# Patient Record
Sex: FEMALE | Race: WHITE | NOT HISPANIC OR LATINO | URBAN - METROPOLITAN AREA
[De-identification: names, ages, dates, MRNs, and addresses within clinical notes are randomized per-mention and may not be internally consistent; named-entity substitution may affect disease eponyms.]

---

## 2023-07-20 ENCOUNTER — OFFICE VISIT (OUTPATIENT)
Dept: OBGYN CLINIC | Facility: MEDICAL CENTER | Age: 48
End: 2023-07-20
Payer: COMMERCIAL

## 2023-07-20 ENCOUNTER — APPOINTMENT (OUTPATIENT)
Dept: RADIOLOGY | Facility: MEDICAL CENTER | Age: 48
End: 2023-07-20
Payer: COMMERCIAL

## 2023-07-20 VITALS — DIASTOLIC BLOOD PRESSURE: 57 MMHG | SYSTOLIC BLOOD PRESSURE: 85 MMHG | HEART RATE: 50 BPM | WEIGHT: 193.4 LBS

## 2023-07-20 DIAGNOSIS — M25.561 RIGHT KNEE PAIN, UNSPECIFIED CHRONICITY: ICD-10-CM

## 2023-07-20 DIAGNOSIS — Z96.651 HISTORY OF TOTAL KNEE ARTHROPLASTY, RIGHT: ICD-10-CM

## 2023-07-20 DIAGNOSIS — Z01.89 ENCOUNTER FOR LOWER EXTREMITY COMPARISON IMAGING STUDY: ICD-10-CM

## 2023-07-20 DIAGNOSIS — Z96.651 PAIN DUE TO TOTAL RIGHT KNEE REPLACEMENT, INITIAL ENCOUNTER (HCC): Primary | ICD-10-CM

## 2023-07-20 DIAGNOSIS — T84.84XA PAIN DUE TO TOTAL RIGHT KNEE REPLACEMENT, INITIAL ENCOUNTER (HCC): Primary | ICD-10-CM

## 2023-07-20 PROCEDURE — 73560 X-RAY EXAM OF KNEE 1 OR 2: CPT

## 2023-07-20 PROCEDURE — 99204 OFFICE O/P NEW MOD 45 MIN: CPT | Performed by: STUDENT IN AN ORGANIZED HEALTH CARE EDUCATION/TRAINING PROGRAM

## 2023-07-20 PROCEDURE — 77073 BONE LENGTH STUDIES: CPT

## 2023-07-20 PROCEDURE — 73564 X-RAY EXAM KNEE 4 OR MORE: CPT

## 2023-07-20 RX ORDER — ALPRAZOLAM 0.5 MG/1
0.5 TABLET ORAL
COMMUNITY
Start: 2023-03-22

## 2023-07-20 RX ORDER — SUMATRIPTAN 100 MG/1
TABLET, FILM COATED ORAL
COMMUNITY
Start: 2023-07-03

## 2023-07-20 RX ORDER — VALACYCLOVIR HYDROCHLORIDE 1 G/1
2000 TABLET, FILM COATED ORAL
COMMUNITY
Start: 2023-03-16

## 2023-07-20 RX ORDER — MONTELUKAST SODIUM 10 MG/1
TABLET ORAL
COMMUNITY
Start: 2023-07-15

## 2023-07-20 RX ORDER — NABUMETONE 750 MG/1
TABLET, FILM COATED ORAL
COMMUNITY
Start: 2023-05-15

## 2023-07-20 RX ORDER — ALBUTEROL SULFATE 90 UG/1
2 AEROSOL, METERED RESPIRATORY (INHALATION) EVERY 4 HOURS PRN
COMMUNITY
Start: 2023-03-11

## 2023-07-20 RX ORDER — TIZANIDINE 4 MG/1
1 TABLET ORAL EVERY EVENING
COMMUNITY
Start: 2023-06-15

## 2023-07-20 RX ORDER — LEVOTHYROXINE SODIUM 0.03 MG/1
TABLET ORAL
COMMUNITY
Start: 2023-06-22

## 2023-07-20 NOTE — PROGRESS NOTES
Knee New Office Note    Assessment:     1. Right knee pain, unspecified chronicity    2. History of total knee arthroplasty, right    3. Encounter for lower extremity comparison imaging study        Plan:     Problem List Items Addressed This Visit    None  Visit Diagnoses     Right knee pain, unspecified chronicity    -  Primary    Relevant Orders    XR knee 4+ vw right injury    XR bone length (scanogram)    History of total knee arthroplasty, right        Relevant Orders    Sedimentation rate, automated    Ambulatory Referral to Dermatology    C-reactive protein    Encounter for lower extremity comparison imaging study        Relevant Orders    XR knee 1 or 2 vw left    XR bone length (scanogram)          Findings today are consistent with right knee pain with history of revision total knee arthroplasty. Imaging and prognosis was reviewed with the patient today. Discussed that she appears to be over-stuffed in the patellofemoral joint. She had better motion after her index arthroplasty without patellar resurfacing which corresponds to her increased patellar thickness with under-resection of her native bone with addition of patellar component. Explained that patellar resurfacing for anterior knee pain has mixed outcomes and approximately 50% of people to not see improvements and she falls in this category. She does have some laxity with pain on exam. Work up to rule out infection ordered including ESR and CRP. Referral to dermatology provided for allergy testing to the cement and metal components as she feels she may have some degree of an allergy. Follow up after testing is complete. Subjective:     Patient ID: Yovany Flores is a 49 y.o. female. Chief Complaint:  HPI:  49 y.o. female presents to the office for evaluation of right knee pain.  She has an extensive history of 9 knee arthroscopies with a total knee arthroplasty in January 2021 at [facility] and subsequent revision to resurface the patella at [facility] in December 2021. She did get some relief following the procedure, but has had return of medial knee pain. She tried an ablation to the knee with worsening symptoms. She notes that she has difficulty with stairs and prolonged sitting. She does note loss of flexion to the knee following the patellar resurfacing. Allergy:  Allergies   Allergen Reactions   • Magnesium Salicylate Shortness Of Breath   • Magnesium Sulfate Anaphylaxis   • Sulfa Antibiotics Anaphylaxis   • Terbutaline Anaphylaxis and Shortness Of Breath   • Cephalexin Hives and Rash   • Colchicine Nausea Only and Other (See Comments)     Feeling "off"     Medications:  all current active meds have been reviewed  Past Medical History:  No past medical history on file. Past Surgical History:  No past surgical history on file. Family History:  No family history on file. Social History:  Social History     Substance and Sexual Activity   Alcohol Use Not on file     Social History     Substance and Sexual Activity   Drug Use Not on file     Social History     Tobacco Use   Smoking Status Not on file   Smokeless Tobacco Not on file           ROS:  General: Per HPI  Skin: Negative, except if noted below  HEENT: Negative  Respiratory: Negative  Cardiovascular: Negative  Gastrointestinal: Negative  Urinary: Negative  Vascular: Negative  Musculoskeletal: Positive per HPI   Neurologic: Positive per HPI  Endocrine: Negative    Objective:  BP Readings from Last 1 Encounters:   07/20/23 (!) 85/57      Wt Readings from Last 1 Encounters:   07/20/23 87.7 kg (193 lb 6.4 oz)        Respiratory:   non-labored respirations    Lymphatics:  no palpable lymph nodes    Gait:   antalgic    Neurologic:   Alert and oriented times 3  Patient with normal sensation except as noted below  Deep tendon reflexes 2+ except as noted in MSK exam    Bilateral Lower Extremity:    Right knee:      Inspection: Well healed total knee incision and portal incisions    Overall limb alignment neutral    Effusion: trace    ROM 0-95 with pain and end range flexion    Extensor Lag: none    Palpation: medial patellar border tenderness to palpation    AP Stability at 90 deg mild laxity     M/L stability in full extension mild laxity and pain    M/L stability in mid-flexion mild laxity and pain    Motor: 5/5 Q/HS/TA/GS/P    Pulses: 2+ DP / 2+ PT    SILT DP/SP/S/S/TN    Imaging:  My interpretation XR AP scanogram/AP bilateral knee/lateral/campo/sunrise right knee: cemented PS total knee arthroplasty, components in good position. No fracture or dislocation. Does appear to have excessive thickness of patella with over-stuffed PFJ. BMI:   There is no height or weight on file to calculate BMI. BSA:   There is no height or weight on file to calculate BSA.            Scribe Attestation    I,:  Crystal Dalal am acting as a scribe while in the presence of the attending physician.:       I,:  Georgina Skinner DO personally performed the services described in this documentation    as scribed in my presence.:

## 2023-07-27 ENCOUNTER — TELEPHONE (OUTPATIENT)
Dept: OBGYN CLINIC | Facility: HOSPITAL | Age: 48
End: 2023-07-27

## 2023-07-27 NOTE — TELEPHONE ENCOUNTER
Caller: patient    Doctor: Lord Mejia    Reason for call: patient has tried getting ahold of Dr. Gerald Jimenez office to schedule from referral and has not been able to get through.  She is wondering if there are any other suggestions    Call back#: [phone]

## 2023-07-27 NOTE — TELEPHONE ENCOUNTER
Unfortunately he is one of the only physicians in the area that does that specific testing, so I would continue to try and get an appointment there.

## 2023-07-31 NOTE — TELEPHONE ENCOUNTER
There is another provider, though they are in [facility] and [place], that also does the testing. His name is Jono Finley, Allergy and Immunology. Phone number is [phone]. If someone could please call this patient and provide her with this information, that would be great.   Thank you

## 2023-07-31 NOTE — TELEPHONE ENCOUNTER
Caller: Patient     Doctor: Luiz Ellis     Reason for call: Patient states she has left numerous messages for this Doctor and nobody calls her back. She is wanting to see if maybe the office can try reaching out to them if possible.      Call back#: [phone]

## 2023-08-15 LAB
CRP SERPL-MCNC: 2.2 MG/L
ERYTHROCYTE [SEDIMENTATION RATE] IN BLOOD BY WESTERGREN METHOD: 2 MM/H

## 2024-02-16 ENCOUNTER — TELEPHONE (OUTPATIENT)
Age: 49
End: 2024-02-16

## 2024-02-16 NOTE — TELEPHONE ENCOUNTER
Caller: Patient    Doctor: Timothy    Reason for call: Patient is calling regarding message from earlier please call patient. Ty.    Call back#: [phone]

## 2024-02-16 NOTE — TELEPHONE ENCOUNTER
Caller: Patient     Doctor: Timothy     Reason for call: Patient stating her knee went a different direction then her body and now it's swollen on her Right knee which she had a knee replacement on she is looking to come in sooner than her scheduled follow up on 2/26 if possible because she is in a lot of pain   Please advise     Call back#: [phone]

## 2024-02-16 NOTE — TELEPHONE ENCOUNTER
Unfortunately this is the earliest appointment that we have available.  We will see her at this appointment. She may also see one of the sports med doctors to get xrays first if she would like.

## 2024-02-26 ENCOUNTER — OFFICE VISIT (OUTPATIENT)
Dept: OBGYN CLINIC | Facility: MEDICAL CENTER | Age: 49
End: 2024-02-26
Payer: COMMERCIAL

## 2024-02-26 VITALS — WEIGHT: 193 LBS | DIASTOLIC BLOOD PRESSURE: 80 MMHG | SYSTOLIC BLOOD PRESSURE: 116 MMHG

## 2024-02-26 DIAGNOSIS — Z96.651 PAIN DUE TO TOTAL RIGHT KNEE REPLACEMENT, INITIAL ENCOUNTER: ICD-10-CM

## 2024-02-26 DIAGNOSIS — T84.84XA PAIN DUE TO TOTAL RIGHT KNEE REPLACEMENT, INITIAL ENCOUNTER: ICD-10-CM

## 2024-02-26 DIAGNOSIS — S83.421A SPRAIN OF LATERAL COLLATERAL LIGAMENT OF RIGHT KNEE, INITIAL ENCOUNTER: Primary | ICD-10-CM

## 2024-02-26 PROCEDURE — 99214 OFFICE O/P EST MOD 30 MIN: CPT | Performed by: STUDENT IN AN ORGANIZED HEALTH CARE EDUCATION/TRAINING PROGRAM

## 2024-02-26 NOTE — PROGRESS NOTES
Knee New Office Note    Assessment:     1. Sprain of lateral collateral ligament of right knee, initial encounter    2. Pain due to total right knee replacement, initial encounter         Plan:     Problem List Items Addressed This Visit          Musculoskeletal and Integument    Sprain of lateral collateral ligament of right knee, initial encounter - Primary    Relevant Orders    Durable Medical Equipment     Other Visit Diagnoses       Pain due to total right knee replacement, initial encounter                Findings today are consistent with right knee LCL sprain with history of revision total knee arthroplasty (revision was patellar resurfacing). Imaging and prognosis was reviewed with the patient today. Hinged knee brace fitted and provided today in the office. Reviewed that this will take time to resolve. She was encouraged to work on her range of motion. She continues to have pain in the knee with worsening of flexion. Discussed revision of her patella as she has increased patellar thickness with under-resection of her native bone with addition of patellar component. Follow up in 4 weeks to re-assess.    Subjective:     Patient ID: Dahlia Encinas is a 48 y.o. female.  Chief Complaint:  HPI:  48 y.o. female presents to the office for follow up of right knee pain with history of revision total knee arthroplasty. Since last visit she had an injury due to slipping on slush to the right knee causing increased swelling. She has an extensive history of 9 knee arthroscopies with a total knee arthroplasty in January 2021 at [facility] and subsequent revision to resurface the patella at [facility] in December 2021. She has been having difficulty with her motion and with stairs. Her quality of life has diminished due to her symptoms.    Allergy:  Allergies   Allergen Reactions    Magnesium Salicylate Shortness Of Breath    Magnesium Sulfate Anaphylaxis    Sulfa Antibiotics Anaphylaxis    Terbutaline Anaphylaxis and  "Shortness Of Breath    Cephalexin Hives and Rash    Colchicine Nausea Only and Other (See Comments)     Feeling \"off\"     Medications:  all current active meds have been reviewed  Past Medical History:  History reviewed. No pertinent past medical history.  Past Surgical History:  History reviewed. No pertinent surgical history.  Family History:  History reviewed. No pertinent family history.  Social History:  Social History     Substance and Sexual Activity   Alcohol Use None     Social History     Substance and Sexual Activity   Drug Use Not on file     Social History     Tobacco Use   Smoking Status Never   Smokeless Tobacco Never           ROS:  General: Per HPI  Skin: Negative, except if noted below  HEENT: Negative  Respiratory: Negative  Cardiovascular: Negative  Gastrointestinal: Negative  Urinary: Negative  Vascular: Negative  Musculoskeletal: Positive per HPI   Neurologic: Positive per HPI  Endocrine: Negative    Objective:  BP Readings from Last 1 Encounters:   02/26/24 116/80      Wt Readings from Last 1 Encounters:   02/26/24 87.5 kg (193 lb)        Respiratory:   non-labored respirations    Lymphatics:  no palpable lymph nodes    Gait:   antalgic     Neurologic:   Alert and oriented times 3  Patient with normal sensation except as noted below  Deep tendon reflexes 2+ except as noted in MSK exam     Bilateral Lower Extremity:     Right knee:     Inspection: Well healed total knee incision and portal incisions    Overall limb alignment neutral    Effusion: trace    ROM 5-85 with pain at end range flexion    Extensor Lag: none    Palpation: medial patellar border and LCL tenderness to palpation    AP Stability at 90 deg mild laxity     M/L stability in full extension laxity and pain    M/L stability in mid-flexion laxity and pain    Motor: 5/5 Q/HS/TA/GS/P    Pulses: 2+ DP / 2+ PT    SILT DP/SP/S/S/TN    Imaging:  No new imaging obtained today    BMI:   There is no height or weight on file to calculate " BMI.  BSA:   There is no height or weight on file to calculate BSA.           Scribe Attestation      I,:  Rosario Seaman am acting as a scribe while in the presence of the attending physician.:       I,:  Jarocho Aguirre, DO personally performed the services described in this documentation    as scribed in my presence.:

## 2024-03-08 ENCOUNTER — PATIENT MESSAGE (OUTPATIENT)
Dept: OBGYN CLINIC | Facility: MEDICAL CENTER | Age: 49
End: 2024-03-08

## 2024-03-19 NOTE — PATIENT COMMUNICATION
Caller: Skylar [facility]    Doctor: Timothy    Reason for call: Skylar called to verify that patient was advised to contact her PCP regarding the completion of Welfare paperwork.     Call back#: n/a  
[phone]

## 2024-03-20 ENCOUNTER — TELEPHONE (OUTPATIENT)
Age: 49
End: 2024-03-20

## 2024-03-20 NOTE — LETTER
March 22, 2024     Patient: Dahlia Enicnas  YOB: 1975  Date of Visit: 2/26/24      To Whom it May Concern:    Dahlia Encinas is a patient known to our practice.  Dahlia was seen in my office on 2/26/24.  She has a history of a previous knee replacement that was not performed by us.  We have never operated on this patient.  At this time we do not feel that there is a reason to keep her out of work.  We also do not fill out welfare paperwork or forms.       If you have any questions or concerns, please don't hesitate to call.         Sincerely,        Dr. Jarocho Aguirre D.O.        CC: No Recipients

## 2024-03-20 NOTE — TELEPHONE ENCOUNTER
Caller: Janel-Triage Nurse at [facility]    Doctor: Timothy     Reason for call: Would like to speak with some in triage regarding welfare paperwork, they are asking why these disability forms are being sent to them to complete? Also the patient is requesting to be out of work for a year so if they need to complete the forms they will need any documentation that we have for to be out of work due to her knee injury.    Call back#: [phone]    Fax # [phone]

## 2024-03-22 NOTE — TELEPHONE ENCOUNTER
I placed a note in the patient's chart stating that we did not operate on her, that we can't keep her out of work and that we do not do welfare paperwork.

## 2024-03-26 ENCOUNTER — OFFICE VISIT (OUTPATIENT)
Dept: OBGYN CLINIC | Facility: MEDICAL CENTER | Age: 49
End: 2024-03-26
Payer: COMMERCIAL

## 2024-03-26 VITALS — DIASTOLIC BLOOD PRESSURE: 67 MMHG | SYSTOLIC BLOOD PRESSURE: 96 MMHG | WEIGHT: 193 LBS | HEART RATE: 59 BPM

## 2024-03-26 DIAGNOSIS — T84.84XD PAIN DUE TO TOTAL RIGHT KNEE REPLACEMENT, SUBSEQUENT ENCOUNTER: ICD-10-CM

## 2024-03-26 DIAGNOSIS — Z96.651 PAIN DUE TO TOTAL RIGHT KNEE REPLACEMENT, SUBSEQUENT ENCOUNTER: ICD-10-CM

## 2024-03-26 DIAGNOSIS — S83.421D SPRAIN OF LATERAL COLLATERAL LIGAMENT OF RIGHT KNEE, SUBSEQUENT ENCOUNTER: Primary | ICD-10-CM

## 2024-03-26 PROCEDURE — 99213 OFFICE O/P EST LOW 20 MIN: CPT | Performed by: STUDENT IN AN ORGANIZED HEALTH CARE EDUCATION/TRAINING PROGRAM

## 2024-03-26 RX ORDER — AMOXICILLIN AND CLAVULANATE POTASSIUM 875; 125 MG/1; MG/1
1 TABLET, FILM COATED ORAL 2 TIMES DAILY
COMMUNITY
Start: 2024-03-15

## 2024-03-26 NOTE — PROGRESS NOTES
"Knee Follow up Office Note    Assessment:     1. Pain due to total right knee replacement, subsequent encounter    2. Sprain of lateral collateral ligament of right knee, subsequent encounter          Plan:     Problem List Items Addressed This Visit          Musculoskeletal and Integument    Sprain of lateral collateral ligament of right knee, initial encounter     Other Visit Diagnoses       Pain due to total right knee replacement, subsequent encounter    -  Primary             49 y/o female with right knee LCL sprain with history of revision total knee arthroplasty (revision was patellar resurfacing). On physical exam today she has improvement with stability and less pain with stress to the LCL.  She still has pain to palpation over the LCL, but overall improved.  We will have her continue with her hinged knee brace for another 2 weeks, and then wean out of the brace over the following 2 weeks.  We will see her back in 6 weeks to see how she is doing once she is out of the brace.  She was encouraged to work on her range of motion.     Subjective:     Patient ID: Dahlia Encinas is a 48 y.o. female.  Chief Complaint:  HPI:  48 y.o. female presents to the office for follow up of right knee pain with history of revision total knee arthroplasty.  She was diagnosed with an LCL sprain at last visit after she sustained a slip on slush to the right knee causing increased swelling. She has been using a hinged knee brace which she feels is helping her symptoms.  She has less pain and feelings of instability of the right knee.     Allergy:  Allergies   Allergen Reactions    Magnesium Salicylate Shortness Of Breath    Magnesium Sulfate Anaphylaxis    Sulfa Antibiotics Anaphylaxis    Terbutaline Anaphylaxis and Shortness Of Breath    Cephalexin Hives and Rash    Colchicine Nausea Only and Other (See Comments)     Feeling \"off\"     Medications:  all current active meds have been reviewed  Past Medical History:  History " reviewed. No pertinent past medical history.  Past Surgical History:  History reviewed. No pertinent surgical history.  Family History:  History reviewed. No pertinent family history.  Social History:  Social History     Substance and Sexual Activity   Alcohol Use None     Social History     Substance and Sexual Activity   Drug Use Not on file     Social History     Tobacco Use   Smoking Status Never   Smokeless Tobacco Never           ROS:  General: Per HPI  Skin: Negative, except if noted below  HEENT: Negative  Respiratory: Negative  Cardiovascular: Negative  Gastrointestinal: Negative  Urinary: Negative  Vascular: Negative  Musculoskeletal: Positive per HPI   Neurologic: Positive per HPI  Endocrine: Negative    Objective:  BP Readings from Last 1 Encounters:   03/26/24 96/67      Wt Readings from Last 1 Encounters:   03/26/24 87.5 kg (193 lb)        Respiratory:   non-labored respirations    Lymphatics:  no palpable lymph nodes    Gait:   antalgic     Neurologic:   Alert and oriented times 3  Patient with normal sensation except as noted below  Deep tendon reflexes 2+ except as noted in MSK exam     Bilateral Lower Extremity:     Right knee:     Inspection: Well healed total knee incision and portal incisions    Overall limb alignment neutral    Effusion: trace    ROM 5-85 with pain at end range flexion    Extensor Lag: none    Palpation: medial patellar border and LCL tenderness to palpation    AP Stability at 90 deg mild laxity     M/L stability in full extension laxity and pain    M/L stability in mid-flexion laxity and pain    Motor: 5/5 Q/HS/TA/GS/P    Pulses: 2+ DP / 2+ PT    SILT DP/SP/S/S/TN        BMI:   There is no height or weight on file to calculate BMI.  BSA:   There is no height or weight on file to calculate BSA.           Scribe Attestation      I,:  Cata Cintron PA-C am acting as a scribe while in the presence of the attending physician.:       I,:  Jarocho Aguirre,  personally  performed the services described in this documentation    as scribed in my presence.:

## 2024-07-05 ENCOUNTER — OFFICE VISIT (OUTPATIENT)
Dept: OBGYN CLINIC | Facility: CLINIC | Age: 49
End: 2024-07-05
Payer: COMMERCIAL

## 2024-07-05 ENCOUNTER — APPOINTMENT (OUTPATIENT)
Dept: RADIOLOGY | Facility: CLINIC | Age: 49
End: 2024-07-05
Payer: COMMERCIAL

## 2024-07-05 VITALS
DIASTOLIC BLOOD PRESSURE: 89 MMHG | HEIGHT: 64 IN | SYSTOLIC BLOOD PRESSURE: 141 MMHG | WEIGHT: 175 LBS | BODY MASS INDEX: 29.88 KG/M2

## 2024-07-05 DIAGNOSIS — M24.661 ARTHROFIBROSIS OF KNEE JOINT, RIGHT: Primary | ICD-10-CM

## 2024-07-05 DIAGNOSIS — Z96.651 PAIN DUE TO TOTAL RIGHT KNEE REPLACEMENT, SUBSEQUENT ENCOUNTER: ICD-10-CM

## 2024-07-05 DIAGNOSIS — T84.84XD PAIN DUE TO TOTAL RIGHT KNEE REPLACEMENT, SUBSEQUENT ENCOUNTER: ICD-10-CM

## 2024-07-05 DIAGNOSIS — M25.561 RIGHT KNEE PAIN, UNSPECIFIED CHRONICITY: ICD-10-CM

## 2024-07-05 PROCEDURE — 73562 X-RAY EXAM OF KNEE 3: CPT

## 2024-07-05 PROCEDURE — 99215 OFFICE O/P EST HI 40 MIN: CPT | Performed by: STUDENT IN AN ORGANIZED HEALTH CARE EDUCATION/TRAINING PROGRAM

## 2024-07-05 RX ORDER — ACETAMINOPHEN 325 MG/1
975 TABLET ORAL ONCE
OUTPATIENT
Start: 2024-07-05 | End: 2024-07-05

## 2024-07-05 RX ORDER — ASCORBIC ACID 500 MG
500 TABLET ORAL 2 TIMES DAILY
Qty: 60 TABLET | Refills: 1 | Status: SHIPPED | OUTPATIENT
Start: 2024-07-05

## 2024-07-05 RX ORDER — SODIUM CHLORIDE, SODIUM LACTATE, POTASSIUM CHLORIDE, CALCIUM CHLORIDE 600; 310; 30; 20 MG/100ML; MG/100ML; MG/100ML; MG/100ML
125 INJECTION, SOLUTION INTRAVENOUS CONTINUOUS
OUTPATIENT
Start: 2024-07-05

## 2024-07-05 RX ORDER — CHLORHEXIDINE GLUCONATE 40 MG/ML
SOLUTION TOPICAL DAILY PRN
OUTPATIENT
Start: 2024-07-05

## 2024-07-05 RX ORDER — FOLIC ACID 1 MG/1
1 TABLET ORAL DAILY
Qty: 30 TABLET | Refills: 1 | Status: SHIPPED | OUTPATIENT
Start: 2024-07-05

## 2024-07-05 RX ORDER — CHLORHEXIDINE GLUCONATE ORAL RINSE 1.2 MG/ML
15 SOLUTION DENTAL ONCE
OUTPATIENT
Start: 2024-07-05 | End: 2024-07-05

## 2024-07-05 NOTE — PROGRESS NOTES
Knee Follow up Office Note    Assessment:     1. Pain due to total right knee replacement, subsequent encounter    2. 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 On physical exam today she has improvement with stability and now no pain with stress to the LCL. Discussed continued observation versus surgical intervention. Reviewed that surgical intervention may not provide her complete relief or increased motion. We did discuss that her motion was better prior to her patellar resurfacing at [facility]. We discussed again that her patellofemoral joint is overstuffed. This overstuffing can lead to decreased flexion as a result. She cannot tolerate her knee due to the stiffness and pain. We discussed revision of her patellar component to normalize her patellar thickness with scare excision along with poly exchange. We reiterated that there is a chance that this does not improve her symptoms and could potentially worsen her symptoms. Due to her dysfunction she would like to proceed with revision.     The patient has elected to proceed with revision right TKA. Risks and benefits of surgery to include but not limited to bleeding, infection, damage to surrounding structures, hardware failure, instability, fracture, dislocation, need for further surgery, continued pain, stiffness, blood clots, stroke, and heart attack was discussed with the patient. Informed consent was signed today in the office. The patient has met with our surgical schedulers and our preoperative joint replacement pathway has been  "initiated. All questions were answered. Patient will follow-up 2 weeks post operatively. BMI is appropriate at 30.04 and is a nonsmoker.      Subjective:     Patient ID: Dahlia Encinas is a 48 y.o. female.  Chief Complaint:  HPI:  48 y.o. female presents to the office for follow up of right knee pain with history of revision total knee arthroplasty and LCL sprain. She has had improvement in her LCL symptoms since last visit and uses her short hinged knee brace for comfort. She continues to experience lack of motion. She notes that her quality of life has diminished and she would like to consider her treatment options. She has significant pain with attempted flexion.     Allergy:  Allergies   Allergen Reactions    Magnesium Salicylate Shortness Of Breath    Magnesium Sulfate Anaphylaxis    Sulfa Antibiotics Anaphylaxis    Terbutaline Anaphylaxis and Shortness Of Breath    Cephalexin Hives and Rash    Colchicine Nausea Only and Other (See Comments)     Feeling \"off\"     Medications:  all current active meds have been reviewed  Past Medical History:  History reviewed. No pertinent past medical history.  Past Surgical History:  History reviewed. No pertinent surgical history.  Family History:  History reviewed. No pertinent family history.  Social History:  Social History     Substance and Sexual Activity   Alcohol Use None     Social History     Substance and Sexual Activity   Drug Use Not on file     Social History     Tobacco Use   Smoking Status Never   Smokeless Tobacco Never           ROS:  General: Per HPI  Skin: Negative, except if noted below  HEENT: Negative  Respiratory: Negative  Cardiovascular: Negative  Gastrointestinal: Negative  Urinary: Negative  Vascular: Negative  Musculoskeletal: Positive per HPI   Neurologic: Positive per HPI  Endocrine: Negative    Objective:  BP Readings from Last 1 Encounters:   07/05/24 141/89      Wt Readings from Last 1 Encounters:   07/05/24 79.4 kg (175 lb)    " "    Respiratory:   non-labored respirations    Lymphatics:  no palpable lymph nodes    Gait:   antalgic     Neurologic:   Alert and oriented times 3  Patient with normal sensation except as noted below  Deep tendon reflexes 2+ except as noted in MSK exam     Bilateral Lower Extremity:     Right knee:     Inspection: Well healed total knee incision and portal incisions    Overall limb alignment neutral    Effusion: trace    ROM 5-85 with pain at end range flexion    Extensor Lag: none    Palpation: mild diffuse    AP Stability at 90 deg mild laxity     M/L stability in full extension laxity and pain    M/L stability in mid-flexion laxity and pain    Motor: 5/5 Q/HS/TA/GS/P    Pulses: 2+ DP / 2+ PT    SILT DP/SP/S/S/TN        BMI:   Estimated body mass index is 30.04 kg/m² as calculated from the following:    Height as of this encounter: 5' 4\" (1.626 m).    Weight as of this encounter: 79.4 kg (175 lb).  BSA:   Estimated body surface area is 1.85 meters squared as calculated from the following:    Height as of this encounter: 5' 4\" (1.626 m).    Weight as of this encounter: 79.4 kg (175 lb).           Scribe Attestation      I,:  Rosario Seaman am acting as a scribe while in the presence of the attending physician.:       I,:  Jarocho Aguirre, DO personally performed the services described in this documentation    as scribed in my presence.:              "

## 2024-07-05 NOTE — PROGRESS NOTES
"Knee Follow up Office Note    Assessment:     1. Right knee pain, unspecified chronicity    2. Sprain of lateral collateral ligament of right knee, initial encounter          Plan:     Problem List Items Addressed This Visit       Sprain of lateral collateral ligament of right knee, initial encounter    Relevant Orders    XR knee 3 vw right non injury     Other Visit Diagnoses       Right knee pain, unspecified chronicity    -  Primary    Relevant Orders    XR knee 3 vw right non injury             47 y/o female with right knee LCL sprain with history of revision total knee arthroplasty (revision was patellar resurfacing). On physical exam today she has improvement with stability and less pain with stress to the LCL.  She still has pain to palpation over the LCL, but overall improved.  We will have her continue with her hinged knee brace for another 2 weeks, and then wean out of the brace over the following 2 weeks.  We will see her back in 6 weeks to see how she is doing once she is out of the brace.  She was encouraged to work on her range of motion.     Subjective:     Patient ID: Dahlia Encinas is a 48 y.o. female.  Chief Complaint:  HPI:  48 y.o. female presents to the office for follow up of right knee pain with history of revision total knee arthroplasty.  She was diagnosed with an LCL sprain at last visit after she sustained a slip on slush to the right knee causing increased swelling. She has been using a hinged knee brace which she feels is helping her symptoms.  She has less pain and feelings of instability of the right knee.     Allergy:  Allergies   Allergen Reactions    Magnesium Salicylate Shortness Of Breath    Magnesium Sulfate Anaphylaxis    Sulfa Antibiotics Anaphylaxis    Terbutaline Anaphylaxis and Shortness Of Breath    Cephalexin Hives and Rash    Colchicine Nausea Only and Other (See Comments)     Feeling \"off\"     Medications:  all current active meds have been reviewed  Past Medical " "History:  History reviewed. No pertinent past medical history.  Past Surgical History:  History reviewed. No pertinent surgical history.  Family History:  History reviewed. No pertinent family history.  Social History:  Social History     Substance and Sexual Activity   Alcohol Use None     Social History     Substance and Sexual Activity   Drug Use Not on file     Social History     Tobacco Use   Smoking Status Never   Smokeless Tobacco Never           ROS:  General: Per HPI  Skin: Negative, except if noted below  HEENT: Negative  Respiratory: Negative  Cardiovascular: Negative  Gastrointestinal: Negative  Urinary: Negative  Vascular: Negative  Musculoskeletal: Positive per HPI   Neurologic: Positive per HPI  Endocrine: Negative    Objective:  BP Readings from Last 1 Encounters:   07/05/24 141/89      Wt Readings from Last 1 Encounters:   07/05/24 79.4 kg (175 lb)        Respiratory:   non-labored respirations    Lymphatics:  no palpable lymph nodes    Gait:   antalgic     Neurologic:   Alert and oriented times 3  Patient with normal sensation except as noted below  Deep tendon reflexes 2+ except as noted in MSK exam     Bilateral Lower Extremity:     Right knee:     Inspection: Well healed total knee incision and portal incisions    Overall limb alignment neutral    Effusion: trace    ROM 5-85 with pain at end range flexion    Extensor Lag: none    Palpation: medial patellar border and LCL tenderness to palpation    AP Stability at 90 deg mild laxity     M/L stability in full extension laxity and pain    M/L stability in mid-flexion laxity and pain    Motor: 5/5 Q/HS/TA/GS/P    Pulses: 2+ DP / 2+ PT    SILT DP/SP/S/S/TN        BMI:   Estimated body mass index is 30.04 kg/m² as calculated from the following:    Height as of this encounter: 5' 4\" (1.626 m).    Weight as of this encounter: 79.4 kg (175 lb).  BSA:   Estimated body surface area is 1.85 meters squared as calculated from the following:    Height as of " "this encounter: 5' 4\" (1.626 m).    Weight as of this encounter: 79.4 kg (175 lb).           Scribe Attestation      I,:   am acting as a scribe while in the presence of the attending physician.:       I,:   personally performed the services described in this documentation    as scribed in my presence.:              "

## 2024-07-11 ENCOUNTER — TELEPHONE (OUTPATIENT)
Age: 49
End: 2024-07-11

## 2024-07-11 DIAGNOSIS — M25.561 RIGHT KNEE PAIN, UNSPECIFIED CHRONICITY: Primary | ICD-10-CM

## 2024-07-11 RX ORDER — MULTIVITAMIN
1 TABLET ORAL DAILY
Qty: 30 TABLET | Refills: 0 | Status: SHIPPED | OUTPATIENT
Start: 2024-07-11

## 2024-07-11 NOTE — TELEPHONE ENCOUNTER
Caller: patient     Doctor: Timothy     Reason for call: please send multivitamin to   [id number] - [place] - [address]     Patient inquiring if there is a waiting list for surgery dates.    Call back#: [phone]

## 2024-07-18 NOTE — PRE-PROCEDURE INSTRUCTIONS
Pre-Surgery Instructions:   Medication Instructions    albuterol (PROVENTIL HFA,VENTOLIN HFA) 90 mcg/act inhaler Uses PRN- OK to take day of surgery    ALPRAZolam (XANAX) 0.5 mg tablet Uses PRN- OK to take day of surgery    ascorbic acid (VITAMIN C) 500 MG tablet Hold day of surgery.    Cholecalciferol (VITAMIN D3) 1,000 units tablet Hold day of surgery.    folic acid (FOLVITE) 1 mg tablet Hold day of surgery.    levothyroxine 25 mcg tablet Take night before surgery    montelukast (SINGULAIR) 10 mg tablet Take night before surgery    Multiple Vitamin (multivitamin) tablet Hold day of surgery.    SUMAtriptan (IMITREX) 100 mg tablet Uses PRN- DO NOT take day of surgery    tiZANidine (ZANAFLEX) 4 mg tablet Take night before surgery    valACYclovir (VALTREX) 1,000 mg tablet Uses PRN- OK to take day of surgery    Medication instructions for day surgery reviewed. Please use only a sip of water to take your instructed medications. Avoid all over the counter vitamins, supplements and NSAIDS for one week prior to surgery per anesthesia guidelines. Tylenol is ok to take as needed.     You will receive a call one business day prior to surgery with an arrival time and hospital directions. If your surgery is scheduled on a Monday, the hospital will be calling you on the Friday prior to your surgery. If you have not heard from anyone by 8pm, please call the hospital supervisor through the hospital  at [phone]. ([place] [phone] or [place] [phone]).    Do not eat or drink anything after midnight the night before your surgery, including candy, mints, lifesavers, or chewing gum. Do not drink alcohol 24hrs before your surgery. Try not to smoke at least 24hrs before your surgery.       Follow the pre surgery showering instructions as listed in the “My Surgical Experience Booklet” or otherwise provided by your surgeon's office. Do not use a blade to shave the surgical area 1 week before surgery. It is okay to  use a clean electric clippers up to 24 hours before surgery. Do not apply any lotions, creams, including makeup, cologne, deodorant, or perfumes after showering on the day of your surgery. Do not use dry shampoo, hair spray, hair gel, or any type of hair products.     No contact lenses, eye make-up, or artificial eyelashes. Remove nail polish, including gel polish, and any artificial, gel, or acrylic nails if possible. Remove all jewelry including rings and body piercing jewelry.     Wear causal clothing that is easy to take on and off. Consider your type of surgery.    Keep any valuables, jewelry, piercings at home. Please bring any specially ordered equipment (sling, braces) if indicated.    Arrange for a responsible person to drive you to and from the hospital on the day of your surgery. Please confirm the visitor policy for the day of your procedure when you receive your phone call with an arrival time.     Call the surgeon's office with any new illnesses, exposures, or additional questions prior to surgery.    Please reference your “My Surgical Experience Booklet” for additional information to prepare for your upcoming surgery.

## 2024-07-22 ENCOUNTER — EVALUATION (OUTPATIENT)
Age: 49
End: 2024-07-22
Payer: COMMERCIAL

## 2024-07-22 DIAGNOSIS — M25.561 CHRONIC PAIN OF RIGHT KNEE: Primary | ICD-10-CM

## 2024-07-22 DIAGNOSIS — G89.29 CHRONIC PAIN OF RIGHT KNEE: Primary | ICD-10-CM

## 2024-07-22 DIAGNOSIS — Z01.818 PRE-OP EVALUATION: ICD-10-CM

## 2024-07-22 DIAGNOSIS — Z96.651 PAIN DUE TO TOTAL RIGHT KNEE REPLACEMENT, SUBSEQUENT ENCOUNTER: ICD-10-CM

## 2024-07-22 DIAGNOSIS — T84.84XD PAIN DUE TO TOTAL RIGHT KNEE REPLACEMENT, SUBSEQUENT ENCOUNTER: ICD-10-CM

## 2024-07-22 PROCEDURE — 97110 THERAPEUTIC EXERCISES: CPT

## 2024-07-22 PROCEDURE — 97161 PT EVAL LOW COMPLEX 20 MIN: CPT

## 2024-07-22 NOTE — PROGRESS NOTES
PT Evaluation     Today's date: 2024  Patient name: Dahlia Encinas  : 1975  MRN: [id number]  Referring provider: Cata Cintron PA-C  Dx:   Encounter Diagnosis     ICD-10-CM    1. Chronic pain of right knee  M25.561     G89.29       2. Pre-op evaluation  Z01.818       3. Pain due to total right knee replacement, subsequent encounter  T84.84XD Ambulatory referral to Physical Therapy    Z96.651           Start Time: 1415  Stop Time: 1445  Total time in clinic (min): 30 minutes    Assessment  Impairments: abnormal coordination, abnormal gait, abnormal muscle firing, abnormal muscle tone, abnormal or restricted ROM, activity intolerance, impaired balance, impaired physical strength, lacks appropriate home exercise program, pain with function, weight-bearing intolerance, poor posture  and poor body mechanics  Functional limitations: prolonged standing/walking; ADLs - cooking, cleaning, dressing, bathing; stair negotiation  Symptom irritability: high    Assessment details: Dahlia Encinas is a 48 y.o. female presenting with R knee pain pre-operatively to R TKA revision scheduled on 2024.  Primary impairments include chronic R knee pain with functional activities, R hip and knee weakness, R knee flexion and extension AROM dysfunction, quadriceps motor control dysfunction.  Educated pt on anatomy and physiology of diagnosis.  Will benefit from skilled PT interventions for community reintegration, ADL management/independence, return to work/sport/hobbies.  Provided pt with written home exercise program to be completed daily.    Understanding of Dx/Px/POC: good     Prognosis: fair    Goals    Short Term Goals:  In 6 weeks, the patient will:  1. Improve R knee extension ROM to at least -10 degrees  2. Improve R knee flexion ROM to at least 100 degrees  3. Supervision with [facility] for self-care    Long Term Goals:  In 12 weeks, the patient will:  1. Improve R hip/knee strength to at least 4/5 MMT  2. FOTO to  "greater than predicted value  3. Independent with HEP for self-care      Plan  Patient would benefit from: skilled physical therapy  Planned modality interventions: manual electrical stimulation    Planned therapy interventions: abdominal trunk stabilization, activity modification, balance, balance/weight bearing training, behavior modification, body mechanics training, community reintegration, coordination, fine motor coordination training, flexibility, functional ROM exercises, gait training, graded activity, graded exercise, graded motor, home exercise program, work reintegration, therapeutic training, therapeutic exercise, therapeutic activities, stretching, strengthening, self care, postural training, patient education, neuromuscular re-education, motor coordination training, massage, manual therapy, joint mobilization and ADL training    Frequency: 2-3x week  Duration in weeks: 12  Plan of Care beginning date: 7/22/2024  Plan of Care expiration date: 10/14/2024  Treatment plan discussed with: patient        Subjective    Pain Location: R grace-patellar, mostly anterior R knee pain  Pain Intensity: achey; current 2/10, worse 8/10  CECI: 2nd R TKA revision  DOI: scheduled 8/14/2024 (initial TKA on 1/2021, 1st revision 12/2021)  Aggravating Factors: prolonged sitting then transitioning to standing/walking, prolonged walking/standing, stair negotiation  Alleviating Factors: none  Living Situation: stairs at home, difficulty with dressing/bathing/cooking/cleaning, occasionally requires assistance getting out of the shower  Constitutional S/S: denies paresthesias, intermittent swelling  Goals: \"normal life\"  PLOF: knee exercises, goes for walks      Objective    Range of Motion: Goniometric revealed the following findings (in degrees).  Joint Motion Right: 7/22/2024 Left: 7/22/2024   Knee Extension -19 0   Knee Flexion 80 140     Strength: MMT revealed the following findings.  Joint Motion Right: 7/22/2024 Left: " 2024   Hip Flexion 3+/5 4+/5   Hip Abduction 4-/5 4+/5   Hip Adduction 4-/5 4+/5   Hip Extension 4-/5 4+/5   Knee Extension 3+/5 5/5   Knee Flexion 3+/5 5/5   Ankle Plantarflexion 4/5 4+/5   Ankle Dorsiflexion 4/5 4+/5     Additional Assessments:  Palpation: maximal TTP grace-patellar - significant pain aggravation with patellar mobilization and STM distal quad  Observation: incision from previous TKA and 1st TKA revision  Gait Pattern: antalgic, decrease knee flex/ext with gait  Balance: impaired  TU.79 seconds w/ UE assistance on chair    Risk Assessment and Prediction Tool results reviewed. Patient reported functional outcome scores reviewed. Discussed DOS and patient's questions were answered to patient's satisfaction. Mobility/ROM results per above. Strength results per above. Balance/Gait (including Timed Up & Go) per above. Virtual Home Assessment was reviewed with patient. Home Preparation Checklist was reviewed with patient including identification of care partner and encouragement of single level set-up. Post-operative pain management expectations discussed to the patient's satisfaction. Post-operative gait training for level ground, stairs, and car transfers was performed. Patient demonstrated competence with immediate post-operative home exercise program.           Precautions: hx of many R knee surgeries - (most recent R TKA 2021 and R TKA revision 2021); 2nd R TKA revision 2024    POC expires Unit limit Auth Expiration date PT/OT + Visit Limit?   10/14/24 BOMN Pending BOMN     Visit/Unit Tracking  AUTH Status:  Date         Pending Used 1         Remaining             Pertinent Findings:                                                                                            Test / Measure  2024   FOTO (Predicted Post-Op) Post-Op   TUG 8.79s w/ UE push off   R hip flexion MMT 3+/5   R knee flex/ext MMT 3+/5   R knee ROM -19 ext  80 flex         Manuals             R  knee PROM, stretching                                                    Neuro Re-Ed             Quad sets             Heel slides             Mini squats                                                                 Ther Ex             HEP review / edu 10'            Rec bike             SAQ             LAQ             HR/TR             Supine gastroc S             STS             FSU             LSU                                                    Ther Activity                                                                                           Gait Training                                       Modalities

## 2024-07-23 ENCOUNTER — TELEPHONE (OUTPATIENT)
Dept: OBGYN CLINIC | Facility: HOSPITAL | Age: 49
End: 2024-07-23

## 2024-08-06 ENCOUNTER — TELEPHONE (OUTPATIENT)
Age: 49
End: 2024-08-06

## 2024-08-06 NOTE — TELEPHONE ENCOUNTER
Caller: [facility]    Doctor/Office: Timothy MCKEON#: [phone]      What needs to be faxed: Pre-op Medical Clearance form-Patient is at their office now    ATTN to: [facility]    Fax#: [phone]

## 2024-08-07 ENCOUNTER — TELEPHONE (OUTPATIENT)
Dept: OBGYN CLINIC | Facility: HOSPITAL | Age: 49
End: 2024-08-07

## 2024-08-09 ENCOUNTER — APPOINTMENT (OUTPATIENT)
Dept: PREADMISSION TESTING | Facility: HOSPITAL | Age: 49
DRG: 313 | End: 2024-08-09
Payer: COMMERCIAL

## 2024-08-09 ENCOUNTER — ANESTHESIA EVENT (OUTPATIENT)
Dept: PERIOP | Facility: HOSPITAL | Age: 49
DRG: 313 | End: 2024-08-09
Payer: COMMERCIAL

## 2024-08-13 PROBLEM — E07.9 DISEASE OF THYROID GLAND: Status: ACTIVE | Noted: 2024-08-13

## 2024-08-13 PROBLEM — Z96.651 PAIN DUE TO TOTAL RIGHT KNEE REPLACEMENT (HCC): Status: ACTIVE | Noted: 2024-08-13

## 2024-08-13 PROBLEM — T84.84XA PAIN DUE TO TOTAL RIGHT KNEE REPLACEMENT (HCC): Status: ACTIVE | Noted: 2024-08-13

## 2024-08-13 NOTE — ANESTHESIA PREPROCEDURE EVALUATION
Procedure:  ARTHROPLASTY KNEE TOTAL REVISION (Right: Knee)    Relevant Problems   Endocrine   (+) Disease of thyroid gland      Rheumatology   (+) Pain due to total right knee replacement (HCC)        Physical Exam    Airway    Mallampati score: II  TM Distance: >3 FB  Neck ROM: full     Dental   No notable dental hx     Cardiovascular      Pulmonary      Other Findings  post-pubertal.      Anesthesia Plan  ASA Score- 2     Anesthesia Type- general with ASA Monitors.         Additional Monitors:     Airway Plan: ETT.    Comment: Adductor block with exparel.       Plan Factors-    Chart reviewed.   Existing labs reviewed. Patient summary reviewed.    Patient is not a current smoker.              Induction- intravenous.    Postoperative Plan- Plan for postoperative opioid use.         Informed Consent- Anesthetic plan and risks discussed with patient.  I personally reviewed this patient with the CRNA. Discussed and agreed on the Anesthesia Plan with the CRNA..

## 2024-08-13 NOTE — DISCHARGE INSTR - AVS FIRST PAGE
Dr. Aguirre Knee Replacement    What to Expect/Activity  It is normal to have some discomfort in your knee for several days to weeks.  You are weight bearing as tolerated to your operative leg with assist devices.  Please use crutches/walker when ambulating until your follow-up  Swelling and discomfort in the knee is normal for several days after surgery. For the first 2-3 days, use ice around the knee to help. Use for 20-30 minutes every 1-2 hours for 48 hours, while awake. You may continue beyond 48 hours as needed.  Place one or two pillows underneath your calf, not your knee, to reduce swelling.  Physical therapy on your own at home should start as soon as possible (see below). Please perform heel slides and extension exercises on your own as well (see diagram).  Please use incentive spirometer 10 times per hour while awake (see diagram).    Dressing/Wound Care/Bathing  You may remove your toe-to-groin dressing 24 hours after surgery. There will be a surgical dressing over your incision that stays in place until follow-up unless water gets under the bandage and then it should be removed.   You may start showering 24 hours after surgery, the surgical dressing will remain in place. Please pat the dressing dry. If you notice the dressing appears saturated or is starting to come off, please replace with dry dressing.  You can keep the dressing in place until follow-up in the office.   Do not place any creams, ointments or gels on or around the incision.  No baths, swimming or submerging until cleared by Dr. Aguirre    Pain Management/Medications  You may resume your usual medications.  Please take the following medications:  Anti-coagulation (blood clot prevention) - aspirin 81mg twice daily for 4 weeks  Pain medication:  Narcotic: Take as directed  NSAID/Anti-inflammatory: Take as directed  Tylenol 1000mg every 8 hours  Zofran (ondasetron) - 4mg every 8 hours as needed for nausea  Stool softeners (senna/colace) -  take daily to prevent constipation as narcotic pain medication causes constipation  Antibiotic - take as directed if prescribed   If you have questions or pain concerns, please contact the office. Pain medication cannot remove all post-operative pain.    Follow up/Call if:  The findings of your surgery will be explained to you and your family immediately after surgery. However, in the post-operative period, during recovery from anesthesia you may not fully remember or fully understand what was said. This will be again gone over when you return for your post-op appointment.  Please contact Dr. Aguirre's office if you experience the following:  Excessive bleeding (bleeding through your dressing)  Fever greater than 101 degrees F after 48 hours (low grade fevers the day or two after surgery are normal)  Persistent nausea or vomiting  Decreased sensation or discoloration of the operative limb  Pain or swelling that is getting worse and not better with medication    Dr. Aguirre's Office Contact: [phone]

## 2024-08-14 ENCOUNTER — ANESTHESIA (OUTPATIENT)
Dept: PERIOP | Facility: HOSPITAL | Age: 49
DRG: 313 | End: 2024-08-14
Payer: COMMERCIAL

## 2024-08-14 ENCOUNTER — HOSPITAL ENCOUNTER (INPATIENT)
Facility: HOSPITAL | Age: 49
LOS: 1 days | Discharge: HOME | DRG: 313 | End: 2024-08-14
Attending: STUDENT IN AN ORGANIZED HEALTH CARE EDUCATION/TRAINING PROGRAM | Admitting: STUDENT IN AN ORGANIZED HEALTH CARE EDUCATION/TRAINING PROGRAM
Payer: COMMERCIAL

## 2024-08-14 ENCOUNTER — APPOINTMENT (INPATIENT)
Dept: RADIOLOGY | Facility: HOSPITAL | Age: 49
DRG: 313 | End: 2024-08-14
Payer: COMMERCIAL

## 2024-08-14 VITALS
TEMPERATURE: 98 F | SYSTOLIC BLOOD PRESSURE: 140 MMHG | BODY MASS INDEX: 30.41 KG/M2 | WEIGHT: 178.13 LBS | RESPIRATION RATE: 47 BRPM | HEART RATE: 62 BPM | OXYGEN SATURATION: 98 % | DIASTOLIC BLOOD PRESSURE: 93 MMHG | HEIGHT: 64 IN

## 2024-08-14 DIAGNOSIS — Z96.651 PAIN DUE TO TOTAL RIGHT KNEE REPLACEMENT, SUBSEQUENT ENCOUNTER: ICD-10-CM

## 2024-08-14 DIAGNOSIS — T84.84XD PAIN DUE TO TOTAL RIGHT KNEE REPLACEMENT, SUBSEQUENT ENCOUNTER: ICD-10-CM

## 2024-08-14 DIAGNOSIS — Z96.651 PAIN DUE TO TOTAL RIGHT KNEE REPLACEMENT, INITIAL ENCOUNTER (HCC): Primary | ICD-10-CM

## 2024-08-14 DIAGNOSIS — T84.84XA PAIN DUE TO TOTAL RIGHT KNEE REPLACEMENT, INITIAL ENCOUNTER (HCC): Primary | ICD-10-CM

## 2024-08-14 LAB
ABO GROUP BLD: NORMAL
APTT PPP: 25 SECONDS (ref 23–34)
BLD GP AB SCN SERPL QL: NEGATIVE
RH BLD: NEGATIVE
SPECIMEN EXPIRATION DATE: NORMAL

## 2024-08-14 PROCEDURE — 97116 GAIT TRAINING THERAPY: CPT

## 2024-08-14 PROCEDURE — 87205 SMEAR GRAM STAIN: CPT | Performed by: STUDENT IN AN ORGANIZED HEALTH CARE EDUCATION/TRAINING PROGRAM

## 2024-08-14 PROCEDURE — 87070 CULTURE OTHR SPECIMN AEROBIC: CPT | Performed by: STUDENT IN AN ORGANIZED HEALTH CARE EDUCATION/TRAINING PROGRAM

## 2024-08-14 PROCEDURE — 0QUD0JZ SUPPLEMENT RIGHT PATELLA WITH SYNTHETIC SUBSTITUTE, OPEN APPROACH: ICD-10-PCS | Performed by: STUDENT IN AN ORGANIZED HEALTH CARE EDUCATION/TRAINING PROGRAM

## 2024-08-14 PROCEDURE — 97167 OT EVAL HIGH COMPLEX 60 MIN: CPT

## 2024-08-14 PROCEDURE — 86850 RBC ANTIBODY SCREEN: CPT | Performed by: STUDENT IN AN ORGANIZED HEALTH CARE EDUCATION/TRAINING PROGRAM

## 2024-08-14 PROCEDURE — 27486 REVISE/REPLACE KNEE JOINT: CPT | Performed by: STUDENT IN AN ORGANIZED HEALTH CARE EDUCATION/TRAINING PROGRAM

## 2024-08-14 PROCEDURE — 27486 REVISE/REPLACE KNEE JOINT: CPT | Performed by: PHYSICIAN ASSISTANT

## 2024-08-14 PROCEDURE — 86900 BLOOD TYPING SEROLOGIC ABO: CPT | Performed by: STUDENT IN AN ORGANIZED HEALTH CARE EDUCATION/TRAINING PROGRAM

## 2024-08-14 PROCEDURE — 87176 TISSUE HOMOGENIZATION CULTR: CPT | Performed by: STUDENT IN AN ORGANIZED HEALTH CARE EDUCATION/TRAINING PROGRAM

## 2024-08-14 PROCEDURE — C9290 INJ, BUPIVACAINE LIPOSOME: HCPCS | Performed by: ANESTHESIOLOGY

## 2024-08-14 PROCEDURE — C1776 JOINT DEVICE (IMPLANTABLE): HCPCS | Performed by: STUDENT IN AN ORGANIZED HEALTH CARE EDUCATION/TRAINING PROGRAM

## 2024-08-14 PROCEDURE — 87102 FUNGUS ISOLATION CULTURE: CPT | Performed by: STUDENT IN AN ORGANIZED HEALTH CARE EDUCATION/TRAINING PROGRAM

## 2024-08-14 PROCEDURE — 87075 CULTR BACTERIA EXCEPT BLOOD: CPT | Performed by: STUDENT IN AN ORGANIZED HEALTH CARE EDUCATION/TRAINING PROGRAM

## 2024-08-14 PROCEDURE — C1713 ANCHOR/SCREW BN/BN,TIS/BN: HCPCS | Performed by: STUDENT IN AN ORGANIZED HEALTH CARE EDUCATION/TRAINING PROGRAM

## 2024-08-14 PROCEDURE — 73560 X-RAY EXAM OF KNEE 1 OR 2: CPT

## 2024-08-14 PROCEDURE — 97163 PT EVAL HIGH COMPLEX 45 MIN: CPT

## 2024-08-14 PROCEDURE — 85730 THROMBOPLASTIN TIME PARTIAL: CPT | Performed by: ANESTHESIOLOGY

## 2024-08-14 PROCEDURE — 86901 BLOOD TYPING SEROLOGIC RH(D): CPT | Performed by: STUDENT IN AN ORGANIZED HEALTH CARE EDUCATION/TRAINING PROGRAM

## 2024-08-14 DEVICE — IMPLANTABLE DEVICE: Type: IMPLANTABLE DEVICE | Site: KNEE | Status: FUNCTIONAL

## 2024-08-14 DEVICE — SMARTSET HIGH PERFORMANCE MV MEDIUM VISCOSITY BONE CEMENT 40G
Type: IMPLANTABLE DEVICE | Site: KNEE | Status: FUNCTIONAL
Brand: SMARTSET

## 2024-08-14 DEVICE — COMPONENT PATELLAR 8.5MM SZ 32 NEXGEN: Type: IMPLANTABLE DEVICE | Site: KNEE | Status: FUNCTIONAL

## 2024-08-14 RX ORDER — ONDANSETRON 2 MG/ML
4 INJECTION INTRAMUSCULAR; INTRAVENOUS EVERY 6 HOURS PRN
Status: DISCONTINUED | OUTPATIENT
Start: 2024-08-14 | End: 2024-08-14 | Stop reason: HOSPADM

## 2024-08-14 RX ORDER — OXYCODONE HYDROCHLORIDE 5 MG/1
10 TABLET ORAL EVERY 4 HOURS PRN
Status: DISCONTINUED | OUTPATIENT
Start: 2024-08-14 | End: 2024-08-14 | Stop reason: HOSPADM

## 2024-08-14 RX ORDER — SENNOSIDES 8.6 MG
1 TABLET ORAL DAILY
Status: DISCONTINUED | OUTPATIENT
Start: 2024-08-14 | End: 2024-08-14 | Stop reason: HOSPADM

## 2024-08-14 RX ORDER — CEFAZOLIN SODIUM 2 G/50ML
2000 SOLUTION INTRAVENOUS ONCE
Status: COMPLETED | OUTPATIENT
Start: 2024-08-14 | End: 2024-08-14

## 2024-08-14 RX ORDER — DEXAMETHASONE SODIUM PHOSPHATE 10 MG/ML
INJECTION, SOLUTION INTRAMUSCULAR; INTRAVENOUS AS NEEDED
Status: DISCONTINUED | OUTPATIENT
Start: 2024-08-14 | End: 2024-08-14

## 2024-08-14 RX ORDER — ONDANSETRON 2 MG/ML
4 INJECTION INTRAMUSCULAR; INTRAVENOUS ONCE AS NEEDED
Status: DISCONTINUED | OUTPATIENT
Start: 2024-08-14 | End: 2024-08-14 | Stop reason: HOSPADM

## 2024-08-14 RX ORDER — OXYCODONE HYDROCHLORIDE 5 MG/1
5 TABLET ORAL EVERY 4 HOURS PRN
Status: DISCONTINUED | OUTPATIENT
Start: 2024-08-14 | End: 2024-08-14 | Stop reason: HOSPADM

## 2024-08-14 RX ORDER — TRANEXAMIC ACID 10 MG/ML
1000 INJECTION, SOLUTION INTRAVENOUS ONCE
Status: COMPLETED | OUTPATIENT
Start: 2024-08-14 | End: 2024-08-14

## 2024-08-14 RX ORDER — ONDANSETRON 2 MG/ML
INJECTION INTRAMUSCULAR; INTRAVENOUS AS NEEDED
Status: DISCONTINUED | OUTPATIENT
Start: 2024-08-14 | End: 2024-08-14

## 2024-08-14 RX ORDER — FENTANYL CITRATE 50 UG/ML
INJECTION, SOLUTION INTRAMUSCULAR; INTRAVENOUS
Status: COMPLETED | OUTPATIENT
Start: 2024-08-14 | End: 2024-08-14

## 2024-08-14 RX ORDER — MIDAZOLAM HYDROCHLORIDE 2 MG/2ML
INJECTION, SOLUTION INTRAMUSCULAR; INTRAVENOUS
Status: COMPLETED | OUTPATIENT
Start: 2024-08-14 | End: 2024-08-14

## 2024-08-14 RX ORDER — KETOROLAC TROMETHAMINE 30 MG/ML
INJECTION, SOLUTION INTRAMUSCULAR; INTRAVENOUS AS NEEDED
Status: DISCONTINUED | OUTPATIENT
Start: 2024-08-14 | End: 2024-08-14

## 2024-08-14 RX ORDER — ASCORBIC ACID 500 MG
500 TABLET ORAL 2 TIMES DAILY
Status: DISCONTINUED | OUTPATIENT
Start: 2024-08-14 | End: 2024-08-14 | Stop reason: HOSPADM

## 2024-08-14 RX ORDER — SIMETHICONE 80 MG
80 TABLET,CHEWABLE ORAL 4 TIMES DAILY PRN
Status: DISCONTINUED | OUTPATIENT
Start: 2024-08-14 | End: 2024-08-14 | Stop reason: HOSPADM

## 2024-08-14 RX ORDER — GABAPENTIN 300 MG/1
300 CAPSULE ORAL
Status: DISCONTINUED | OUTPATIENT
Start: 2024-08-14 | End: 2024-08-14 | Stop reason: HOSPADM

## 2024-08-14 RX ORDER — PROPOFOL 10 MG/ML
INJECTION, EMULSION INTRAVENOUS AS NEEDED
Status: DISCONTINUED | OUTPATIENT
Start: 2024-08-14 | End: 2024-08-14

## 2024-08-14 RX ORDER — BUPIVACAINE HYDROCHLORIDE 2.5 MG/ML
INJECTION, SOLUTION EPIDURAL; INFILTRATION; INTRACAUDAL
Status: COMPLETED | OUTPATIENT
Start: 2024-08-14 | End: 2024-08-14

## 2024-08-14 RX ORDER — LORAZEPAM 2 MG/ML
0.5 INJECTION INTRAMUSCULAR ONCE
Status: DISCONTINUED | OUTPATIENT
Start: 2024-08-14 | End: 2024-08-14 | Stop reason: HOSPADM

## 2024-08-14 RX ORDER — BUPIVACAINE HYDROCHLORIDE AND EPINEPHRINE 2.5; 5 MG/ML; UG/ML
INJECTION, SOLUTION EPIDURAL; INFILTRATION; INTRACAUDAL; PERINEURAL AS NEEDED
Status: DISCONTINUED | OUTPATIENT
Start: 2024-08-14 | End: 2024-08-14 | Stop reason: HOSPADM

## 2024-08-14 RX ORDER — DOXYCYCLINE 100 MG/1
100 CAPSULE ORAL 2 TIMES DAILY
Qty: 28 CAPSULE | Refills: 0 | Status: SHIPPED | OUTPATIENT
Start: 2024-08-14 | End: 2024-08-28

## 2024-08-14 RX ORDER — ACETAMINOPHEN 325 MG/1
975 TABLET ORAL EVERY 8 HOURS
Status: DISCONTINUED | OUTPATIENT
Start: 2024-08-14 | End: 2024-08-14 | Stop reason: HOSPADM

## 2024-08-14 RX ORDER — FOLIC ACID 1 MG/1
1 TABLET ORAL DAILY
Status: DISCONTINUED | OUTPATIENT
Start: 2024-08-14 | End: 2024-08-14 | Stop reason: HOSPADM

## 2024-08-14 RX ORDER — ROCURONIUM BROMIDE 10 MG/ML
INJECTION, SOLUTION INTRAVENOUS AS NEEDED
Status: DISCONTINUED | OUTPATIENT
Start: 2024-08-14 | End: 2024-08-14

## 2024-08-14 RX ORDER — MAGNESIUM HYDROXIDE 1200 MG/15ML
LIQUID ORAL AS NEEDED
Status: DISCONTINUED | OUTPATIENT
Start: 2024-08-14 | End: 2024-08-14 | Stop reason: HOSPADM

## 2024-08-14 RX ORDER — METOCLOPRAMIDE HYDROCHLORIDE 5 MG/ML
10 INJECTION INTRAMUSCULAR; INTRAVENOUS ONCE AS NEEDED
Status: DISCONTINUED | OUTPATIENT
Start: 2024-08-14 | End: 2024-08-14 | Stop reason: HOSPADM

## 2024-08-14 RX ORDER — DOCUSATE SODIUM 100 MG/1
100 CAPSULE, LIQUID FILLED ORAL 2 TIMES DAILY
Status: DISCONTINUED | OUTPATIENT
Start: 2024-08-14 | End: 2024-08-14 | Stop reason: HOSPADM

## 2024-08-14 RX ORDER — AMOXICILLIN 250 MG
1 CAPSULE ORAL DAILY
Qty: 30 TABLET | Refills: 0 | Status: SHIPPED | OUTPATIENT
Start: 2024-08-14

## 2024-08-14 RX ORDER — OXYCODONE HYDROCHLORIDE 5 MG/1
5 TABLET ORAL EVERY 4 HOURS PRN
Qty: 42 TABLET | Refills: 0 | Status: SHIPPED | OUTPATIENT
Start: 2024-08-14 | End: 2024-08-24

## 2024-08-14 RX ORDER — SODIUM CHLORIDE, SODIUM LACTATE, POTASSIUM CHLORIDE, CALCIUM CHLORIDE 600; 310; 30; 20 MG/100ML; MG/100ML; MG/100ML; MG/100ML
125 INJECTION, SOLUTION INTRAVENOUS CONTINUOUS
Status: DISCONTINUED | OUTPATIENT
Start: 2024-08-14 | End: 2024-08-14 | Stop reason: HOSPADM

## 2024-08-14 RX ORDER — SODIUM CHLORIDE, SODIUM LACTATE, POTASSIUM CHLORIDE, CALCIUM CHLORIDE 600; 310; 30; 20 MG/100ML; MG/100ML; MG/100ML; MG/100ML
100 INJECTION, SOLUTION INTRAVENOUS CONTINUOUS
Status: DISCONTINUED | OUTPATIENT
Start: 2024-08-14 | End: 2024-08-14 | Stop reason: HOSPADM

## 2024-08-14 RX ORDER — ONDANSETRON 4 MG/1
4 TABLET, ORALLY DISINTEGRATING ORAL EVERY 6 HOURS PRN
Qty: 20 TABLET | Refills: 0 | Status: SHIPPED | OUTPATIENT
Start: 2024-08-14

## 2024-08-14 RX ORDER — ACETAMINOPHEN 500 MG
1000 TABLET ORAL EVERY 8 HOURS
Qty: 60 TABLET | Refills: 0 | Status: SHIPPED | OUTPATIENT
Start: 2024-08-14

## 2024-08-14 RX ORDER — FENTANYL CITRATE 50 UG/ML
INJECTION, SOLUTION INTRAMUSCULAR; INTRAVENOUS AS NEEDED
Status: DISCONTINUED | OUTPATIENT
Start: 2024-08-14 | End: 2024-08-14

## 2024-08-14 RX ORDER — HYDROMORPHONE HCL/PF 1 MG/ML
0.5 SYRINGE (ML) INJECTION
Status: DISCONTINUED | OUTPATIENT
Start: 2024-08-14 | End: 2024-08-14 | Stop reason: HOSPADM

## 2024-08-14 RX ORDER — ACETAMINOPHEN 325 MG/1
650 TABLET ORAL EVERY 4 HOURS PRN
Status: DISCONTINUED | OUTPATIENT
Start: 2024-08-14 | End: 2024-08-14 | Stop reason: HOSPADM

## 2024-08-14 RX ORDER — PANTOPRAZOLE SODIUM 40 MG/1
40 TABLET, DELAYED RELEASE ORAL DAILY
Status: DISCONTINUED | OUTPATIENT
Start: 2024-08-14 | End: 2024-08-14 | Stop reason: HOSPADM

## 2024-08-14 RX ORDER — LIDOCAINE HYDROCHLORIDE 10 MG/ML
INJECTION, SOLUTION EPIDURAL; INFILTRATION; INTRACAUDAL; PERINEURAL AS NEEDED
Status: DISCONTINUED | OUTPATIENT
Start: 2024-08-14 | End: 2024-08-14

## 2024-08-14 RX ORDER — CHLORHEXIDINE GLUCONATE ORAL RINSE 1.2 MG/ML
15 SOLUTION DENTAL ONCE
Status: COMPLETED | OUTPATIENT
Start: 2024-08-14 | End: 2024-08-14

## 2024-08-14 RX ORDER — CALCIUM CARBONATE 500 MG/1
1000 TABLET, CHEWABLE ORAL DAILY PRN
Status: DISCONTINUED | OUTPATIENT
Start: 2024-08-14 | End: 2024-08-14 | Stop reason: HOSPADM

## 2024-08-14 RX ORDER — CEFAZOLIN SODIUM 2 G/50ML
2000 SOLUTION INTRAVENOUS EVERY 8 HOURS
Status: DISCONTINUED | OUTPATIENT
Start: 2024-08-14 | End: 2024-08-14 | Stop reason: HOSPADM

## 2024-08-14 RX ORDER — CHLORHEXIDINE GLUCONATE 40 MG/ML
SOLUTION TOPICAL DAILY PRN
Status: DISCONTINUED | OUTPATIENT
Start: 2024-08-14 | End: 2024-08-14 | Stop reason: HOSPADM

## 2024-08-14 RX ORDER — HYDROMORPHONE HCL/PF 1 MG/ML
0.5 SYRINGE (ML) INJECTION
Status: COMPLETED | OUTPATIENT
Start: 2024-08-14 | End: 2024-08-14

## 2024-08-14 RX ORDER — HYDROMORPHONE HCL/PF 1 MG/ML
SYRINGE (ML) INJECTION AS NEEDED
Status: DISCONTINUED | OUTPATIENT
Start: 2024-08-14 | End: 2024-08-14

## 2024-08-14 RX ORDER — ACETAMINOPHEN 325 MG/1
975 TABLET ORAL ONCE
Status: COMPLETED | OUTPATIENT
Start: 2024-08-14 | End: 2024-08-14

## 2024-08-14 RX ADMIN — KETOROLAC TROMETHAMINE 15 MG: 30 INJECTION, SOLUTION INTRAMUSCULAR; INTRAVENOUS at 08:17

## 2024-08-14 RX ADMIN — HYDROMORPHONE HYDROCHLORIDE 0.5 MG: 1 INJECTION, SOLUTION INTRAMUSCULAR; INTRAVENOUS; SUBCUTANEOUS at 09:36

## 2024-08-14 RX ADMIN — TRANEXAMIC ACID 1000 MG: 10 INJECTION, SOLUTION INTRAVENOUS at 07:30

## 2024-08-14 RX ADMIN — MIDAZOLAM 2 MG: 1 INJECTION INTRAMUSCULAR; INTRAVENOUS at 07:11

## 2024-08-14 RX ADMIN — CEFAZOLIN SODIUM 2000 MG: 2 SOLUTION INTRAVENOUS at 07:48

## 2024-08-14 RX ADMIN — SODIUM CHLORIDE, SODIUM LACTATE, POTASSIUM CHLORIDE, AND CALCIUM CHLORIDE: .6; .31; .03; .02 INJECTION, SOLUTION INTRAVENOUS at 08:40

## 2024-08-14 RX ADMIN — CHLORHEXIDINE GLUCONATE 15 ML: 1.2 RINSE ORAL at 06:31

## 2024-08-14 RX ADMIN — PROPOFOL 180 MG: 10 INJECTION, EMULSION INTRAVENOUS at 07:33

## 2024-08-14 RX ADMIN — ACETAMINOPHEN 975 MG: 325 TABLET, FILM COATED ORAL at 06:31

## 2024-08-14 RX ADMIN — SODIUM CHLORIDE, SODIUM LACTATE, POTASSIUM CHLORIDE, AND CALCIUM CHLORIDE 125 ML/HR: .6; .31; .03; .02 INJECTION, SOLUTION INTRAVENOUS at 06:35

## 2024-08-14 RX ADMIN — FENTANYL CITRATE 50 MCG: 50 INJECTION INTRAMUSCULAR; INTRAVENOUS at 07:33

## 2024-08-14 RX ADMIN — BUPIVACAINE HYDROCHLORIDE 10 ML: 2.5 INJECTION, SOLUTION EPIDURAL; INFILTRATION; INTRACAUDAL; PERINEURAL at 07:15

## 2024-08-14 RX ADMIN — DEXAMETHASONE SODIUM PHOSPHATE 5 MG: 10 INJECTION, SOLUTION INTRAMUSCULAR; INTRAVENOUS at 07:38

## 2024-08-14 RX ADMIN — ONDANSETRON 4 MG: 2 INJECTION INTRAMUSCULAR; INTRAVENOUS at 08:16

## 2024-08-14 RX ADMIN — BUPIVACAINE 10 ML: 13.3 INJECTION, SUSPENSION, LIPOSOMAL INFILTRATION at 07:15

## 2024-08-14 RX ADMIN — ROCURONIUM BROMIDE 50 MG: 10 INJECTION INTRAVENOUS at 07:34

## 2024-08-14 RX ADMIN — HYDROMORPHONE HYDROCHLORIDE 0.5 MG: 1 INJECTION, SOLUTION INTRAMUSCULAR; INTRAVENOUS; SUBCUTANEOUS at 08:15

## 2024-08-14 RX ADMIN — HYDROMORPHONE HYDROCHLORIDE 1 MG: 1 INJECTION, SOLUTION INTRAMUSCULAR; INTRAVENOUS; SUBCUTANEOUS at 09:03

## 2024-08-14 RX ADMIN — HYDROMORPHONE HYDROCHLORIDE 0.5 MG: 1 INJECTION, SOLUTION INTRAMUSCULAR; INTRAVENOUS; SUBCUTANEOUS at 09:11

## 2024-08-14 RX ADMIN — OXYCODONE HYDROCHLORIDE 10 MG: 5 TABLET ORAL at 10:07

## 2024-08-14 RX ADMIN — SUGAMMADEX 200 MG: 100 INJECTION, SOLUTION INTRAVENOUS at 08:55

## 2024-08-14 RX ADMIN — PROPOFOL 70 MG: 10 INJECTION, EMULSION INTRAVENOUS at 08:50

## 2024-08-14 RX ADMIN — LIDOCAINE HYDROCHLORIDE 50 MG: 10 INJECTION, SOLUTION EPIDURAL; INFILTRATION; INTRACAUDAL; PERINEURAL at 07:33

## 2024-08-14 RX ADMIN — FENTANYL CITRATE 50 MCG: 50 INJECTION, SOLUTION INTRAMUSCULAR; INTRAVENOUS at 07:11

## 2024-08-14 RX ADMIN — ACETAMINOPHEN 975 MG: 325 TABLET, FILM COATED ORAL at 13:04

## 2024-08-14 NOTE — OCCUPATIONAL THERAPY NOTE
"    Occupational Therapy Evaluation     Patient Name: Dahlia Encinas  Today's Date: 8/14/2024  Problem List  Principal Problem:    Pain due to total right knee replacement (HCC)  Active Problems:    Disease of thyroid gland    Past Medical History  Past Medical History:   Diagnosis Date    Disease of thyroid gland      Past Surgical History  Past Surgical History:   Procedure Laterality Date    CHOLECYSTECTOMY      HYSTERECTOMY      KNEE SURGERY               08/14/24 1228   OT Last Visit   OT Visit Date 08/14/24   Note Type   Note type Evaluation   Pain Assessment   Pain Assessment Tool 0-10   Pain Score 4   Pain Location/Orientation Orientation: Right;Location: Knee   Hospital Pain Intervention(s) Ambulation/increased activity   Restrictions/Precautions   Weight Bearing Precautions Per Order Yes   RLE Weight Bearing Per Order WBAT   Other Precautions WBS;Fall Risk;Pain   Home Living   Type of Home House   Home Layout Stairs to enter with rails;One level  (5-6 ROSA)   Bathroom Shower/Tub Tub/shower unit   Bathroom Toilet Standard   Bathroom Equipment Shower chair;Toilet raiser   Home Equipment Walker;Cane;Crutches   Additional Comments No DME PTA   Prior Function   Level of [place] Independent with ADLs;Independent with functional mobility;Independent with IADLS   Lives With Significant other  (&children)   Receives Help From Family   IADLs Independent with driving;Independent with meal prep;Independent with medication management   Lifestyle   Service to Others Pt reports having worked as a  at OP [employer]   General   Additional Pertinent History POD#0 R TKA Revision   Family/Caregiver Present Yes   Additional General Comments Fiance   Subjective   Subjective \"i've had surgery like 20 times\"   ADL   Eating Assistance 7  Independent   Grooming Assistance 7  Independent   Grooming Deficit Wash/dry hands   UB Bathing Assistance 7  Independent   LB Bathing Assistance 7  Independent   UB Dressing Assistance 7 "  Independent   LB Dressing Assistance 7  Independent   LB Dressing Deficit Thread RLE into pants;Thread LLE into pants;Thread RLE into underwear;Thread LLE into underwear;Pull up over hips   Toileting Assistance  7  Independent   Toileting Deficit Perineal hygiene   Bed Mobility   Supine to Sit 5  Supervision   Additional Comments Orthostatic vitals (-)   Transfers   Sit to Stand 6  Modified independent   Additional items Armrests   Stand to Sit 6  Modified independent   Additional items Armrests   Toilet transfer 5  Supervision   Additional items Standard toilet  (Grab bars)   Functional Mobility   Functional Mobility 5  Supervision   Additional Comments Greater than functional household distance   Additional items Rolling walker   Balance   Static Sitting Normal   Dynamic Sitting Normal   Static Standing Good   Dynamic Standing Good   Ambulatory Good   Activity Tolerance   Activity Tolerance Patient limited by pain   Medical Staff Made Aware PT Anita   Nurse Made Aware RN Mariana HUITRON Assessment   RUE Assessment WFL   LUE Assessment   LUE Assessment WFL   Cognition   Overall Cognitive Status WFL   Arousal/Participation Alert   Attention Within functional limits   Orientation Level Oriented X4   Memory Within functional limits   Following Commands Follows all commands and directions without difficulty   Assessment   Prognosis Good   Assessment Pt is a 48 y.o. female seen for OT evaluation at [facility], admitted 8/14/2024 w/ Pain due to total right knee replacement (HCC). Pt is POD#0 R TKR revision. OT completed extensive review of pt's medical and social history. Comorbidities affecting pt's functional performance at time of assessment include: sprain of LCL R knee, h/o R knee replacement. Prior to admission, pt was living with her SO in a 1SH with 5-6 ROSA. Pt was IND with I/ADLs & functional txfers/mobility. Upon evaluation, pt presents to OT as Mod I for functional txfers, S for functional mobility, IND  for ADLs. Based on findings, pt is of high complexity. The patient's raw score on the AM-PAC Daily Activity inpatient short form is 24, standardized score is 57.54, greater than 39.4. Patients at this level are likely to benefit from DC to home. Please refer to the recommendation of the Occupational Therapist for safe DC planning. At this time, OT recommendations at time of discharge are no OT needs. No further acute OT needs indicated at this time - Recommend pt continue to be OOB for meals, ambulation to/from BR, perform self care tasks, and mobility in hallway with nursing. D/C from OT caseload with above recommendations.   Goals   Patient Goals Pt wants to go home   Plan   OT Frequency Eval only   Discharge Recommendation   Rehab Resource Intensity Level, OT No post-acute rehabilitation needs   AM-PAC Daily Activity Inpatient   Lower Body Dressing 4   Bathing 4   Toileting 4   Upper Body Dressing 4   Grooming 4   Eating 4   Daily Activity Raw Score 24   Daily Activity Standardized Score (Calc for Raw Score >=11) 57.54   AM-PAC Applied Cognition Inpatient   Following a Speech/Presentation 4   Understanding Ordinary Conversation 4   Taking Medications 4   Remembering Where Things Are Placed or Put Away 4   Remembering List of 4-5 Errands 4   Taking Care of Complicated Tasks 4   Applied Cognition Raw Score 24   Applied Cognition Standardized Score 62.21   End of Consult   Education Provided Yes;Family or social support of family present for education by provider   Patient Position at End of Consult Seated edge of bed;All needs within reach   Nurse Communication Nurse aware of consult     Tess Marinelli OTR/L

## 2024-08-14 NOTE — OP NOTE
OPERATIVE REPORT  PATIENT NAME: Dahlia Encinas  : 1975  MRN: [id number]  Pt Location:  UB OR ROOM 02    Surgery Date: 2024    Surgeons and Role:     * Jarocho Aguirre, DO - Primary     * Cata Cintron, PA-C - Assisting      * Helen Seaman, OT-C - Assisting     Preop Diagnosis:  Pain due to total right knee replacement, subsequent encounter [T84.84XD, Z96.651]    Post-Op Diagnosis Codes:     * Pain due to total right knee replacement, subsequent encounter [T84.84XD, Z96.651]    Procedure(s):  Right - ARTHROPLASTY KNEE TOTAL REVISION    Specimens:  ID Type Source Tests Collected by Time Destination   A : Right Knee  #1 Tissue Joint, Right Knee ANAEROBIC CULTURE AND GRAM STAIN, FUNGAL CULTURE, CULTURE, TISSUE AND GRAM STAIN Jarocho Aguirre, DO 2024 0758    B : Right Knee #2 Tissue Joint, Right Knee ANAEROBIC CULTURE AND GRAM STAIN, FUNGAL CULTURE, CULTURE, TISSUE AND GRAM STAIN Jarocho Aguirre, DO 2024 0800    C : Right Knee #3 Tissue Joint, Right Knee ANAEROBIC CULTURE AND GRAM STAIN, FUNGAL CULTURE, CULTURE, TISSUE AND GRAM STAIN Jarocho Aguirre, DO 2024 0801        Estimated Blood Loss:   25 cc    Drains:  * No LDAs found *    Anesthesia Type:   GETA     Operative Indications:  Pain due to total right knee replacement, subsequent encounter [T84.84XD, Z96.651]    47 y/o female with right knee arthrofibrosis with history of revision total knee arthroplasty (revision was patellar resurfacing for un resurfaced patella). We did discuss that her motion was better prior to her patellar resurfacing at OSH. We discussed again that her patellofemoral joint is overstuffed. This overstuffing can lead to decreased flexion as a result. She cannot tolerate her knee due to the stiffness and pain. We discussed revision of her patellar component to normalize her patellar thickness with scare excision along with poly exchange. We reiterated that there is a chance that this does  not improve her symptoms and could potentially worsen her symptoms. Due to her dysfunction she would like to proceed with revision. The patient has elected to proceed with revision right TKA. Risks and benefits of surgery to include but not limited to bleeding, infection, damage to surrounding structures, hardware failure, instability, fracture, dislocation, need for further surgery, continued pain, stiffness, blood clots, stroke, and heart attack was discussed with the patient.     Operative Findings:  Pre-op ROM 5-90  Post-op ROM 0-130+ calf to thigh gravity-assisted flexion  Patellar thickness with button 28 mm  Medial facet cartilage was still present  Oval medialized patellar button    Implant Name Type Inv. Item Serial No.  Lot No. LRB No. Used Action   CEMENT BONE SMART SET GRAY MED VISC - [id number]  CEMENT BONE SMART SET GRAY MED VISC  DEPUY  Right 1 Implanted   COMPONENT PATELLAR 8.5MM SZ 32 NEXGEN - [id number]  COMPONENT PATELLAR 8.5MM SZ 32 NEXGEN  Michael [id number] Right 1 Implanted   PROLONG HIGHLY CROSSLINKED POLYETHYLENE    Mihcael [id number] Right 1 Implanted     Complications:   None    Knee Technique: Suture (direct) Repair  Knee Approach: Medial Parapatellar    Procedure and Technique:  Patient was seen in the preoperative holding area.  Informed consent was confirmed and all questions were answered. Operative site was confirmed and marked. Patient was taken to the operating room and transferred to the operating room table. General anesthesia was performed. The patient was supine and all bony prominences were well-padded. Right lower extremity was prepped and draped in usual sterile fashion with chlorhexidine scrub.  Patient was given perioperative antibiotics prior to incision and SCDs were placed on the non-operative leg.  A formal time-out was performed identifying the patient and confirmed operative site.  Her preoperative motion was 5-90. The knee was exsanguinated and a pneumatic  tourniquet was inflated at 250 mmHg.  The patient's previous anterior knee incision was resected in its entirety with scar down to the level of the extensor mechanism.  A medial flap was created along with a small lateral flap.  A medial parapatellar approach was utilized to enter the knee.  Upon performing the arthrotomy there was an effusion of straw-colored fluid.  We performed a medial peel and extensive incision and drainage of the knee down to and including bone.  We resected scar from the medial and lateral extensor mechanism to establish the gutters.  We then inspected the implants and found the femoral and tibial components to be well fixed. The polyethylene was removed without complication.     At this time we evaluated the patella.  We measured the construct with a caliper and her patella with patellar button measured 28 mm.  Her medial facet cartilage was still intact and the patella was cut asymmetrically.  The patellar button was a medialized oval patella which appeared to be a DePuy component.  This time we used a sagittal saw to remove the patellar button.  We next resected the native patella down to a flat 14 mm resection.  We next sized the patella to a 32 mm round Michael patella.  We drilled the peg holes.  At this time we measured the overall thickness and it was confirmed to be 22.5 mm which is more appropriate for a 5 foot 4 female.  At this time we removed her 14 mm polyethylene.  We were then able to place a protective laminar  in the flexion gap to release scar in her posterior femur.  We then used a 12 mm trial in order to further release scar tissue with motion.  A 12 mm polyethylene had a small amount of varus valgus instability.  We then trialed the knee with a 14 mm PS polyethylene with excellent stability.  With her 14 mm trial and 32 mm patellar button trial her motion was 0 to greater than 130 degrees calf to thigh flexion.  At this time we removed all the trials. At this  time we extensively irrigated the knee and assured the locking mechanism was clear on the tibia.    We exposed the tibia and assured the tibial locking mechanism was cleared of all soft tissue.  We placed the 14 mm PS polyethylene and the tibial tray and locked in place with the locking device.  At this time we dried the patellar surface and cemented the button in place. Held the clamp until the cement cured. The tourniquet was released at 26 minutes and all bleeders were coagulated.  The knee was irrigated with the remainder of the 3 L of normal saline solution.  The joint local was injected in the posterior capsule and around the tissues of the knee.  The knee was taken through a final range of motion and found to have excellent stability and patellar tracking.  All instrument and sponge counts were correct x2. The arthrotomy was closed with #1 Stratafix suture.  Subcutaneous tissues were closed with 2-0 Vicryl.  The skin was closed with 3-0 Stratafix followed by Dermabond.  A sterile Mepilex was placed along with a thigh foot Ace wrap.  Patient was awoken from anesthesia and taken to recovery room in stable condition.        48F s/p R TKA revision for arthrofibrosis and overstuffed patellofemoral joint 8/14  - multi-modal pain control  - ancef pre-op, doxycycline x 2 weeks  - DVT ppx: aspirin 81mg BID x 4 weeks  - PT/OT  - WBAT  - ROM as tolerated, pillow/blankets under achilles not behind knee while in bed  - f/u I/O cultures  - f/u 10-14 days       I was present for the entire procedure, A qualified resident physician was not available and A physician assistant was required during the procedure for retraction tissue handling,dissection and suturing     Patient Disposition:  PACU       SIGNATURE: Jarocho Aguirre DO  DATE: August 14, 2024  TIME: 10:36 AM

## 2024-08-14 NOTE — PLAN OF CARE
Problem: PHYSICAL THERAPY ADULT  Goal: Performs mobility at highest level of function for planned discharge setting.  See evaluation for individualized goals.  Description: Treatment/Interventions:  (D/C P.T.)          See flowsheet documentation for full assessment, interventions and recommendations.  Note: Prognosis: Good  Problem List: Decreased range of motion, Decreased strength, Decreased endurance, Impaired balance, Decreased mobility, Obesity, Pain  Assessment: Patient is a 47y/o F POD 0 R TKA revision. Patient resides with fiance and children in a 1SH with steps to enter. She is independent at baseline. Current medical status includes multiple lines, telemetry, pain, swelling, fall risk, decreased mobility and balance. Patient was received supine in bed. Orthostatic vitals negative. High pain levels with activity. Patient initially was at a supervision level for all mobility and progressed to a mod I level during session. She ambulated further than a household distance and completed a stair trial Good support from family. No further inpatient P.T. needs. Level 3 resources. Low intensity. The patient's AM-PAC Basic Mobility Inpatient Short Form Raw Score is 24. A Raw score of greater than 17 suggests the patient may benefit from discharge to home. Please also refer to the recommendation of the Physical Therapist for safe discharge planning.  Barriers to Discharge: None     Rehab Resource Intensity Level, PT: III (Minimum Resource Intensity)    See flowsheet documentation for full assessment.

## 2024-08-14 NOTE — ANESTHESIA POSTPROCEDURE EVALUATION
Post-Op Assessment Note    CV Status:  Stable  Pain Score: 10 (pt received IV Dilaudid and IV Toradol.)    Pain management: adequate    Multimodal analgesia used between 6 hours prior to anesthesia start to PACU discharge    Mental Status:  Awake and anxious   Hydration Status:  Euvolemic   PONV Controlled:  None   Airway Patency:  Patent     Post Op Vitals Reviewed: Yes    No anethesia notable event occurred.    Staff: CRNA               /82 (08/14/24 0906)    Temp 98 °F (36.7 °C) (08/14/24 0906)    Pulse 90 (08/14/24 0906)   Resp (!) 10 (08/14/24 0906)    SpO2   100%

## 2024-08-14 NOTE — UTILIZATION REVIEW
Initial Clinical Review    Elective inpatient  surgical procedure  Age/Sex: 48 y.o. female  Surgery Date: 8/14  Procedure: Right - ARTHROPLASTY KNEE TOTAL REVISION     Anesthesia: GETA, Right adductor canal nerve block at surgeon's request and post-op pain management     Operative Findings: Pre-op ROM 5-90  Post-op ROM 0-130+ calf to thigh gravity-assisted flexion  Patellar thickness with button 28 mm  Medial facet cartilage was still present  Oval medialized patellar button      Admission Orders: Date/Time/Statement:   Admission Orders (From admission, onward)       Ordered        08/14/24 0901  Inpatient Admission  Once                          Orders Placed This Encounter   Procedures    Inpatient Admission     Standing Status:   Standing     Number of Occurrences:   1     Order Specific Question:   Level of Care     Answer:   Med Surg [16]     Order Specific Question:   Estimated length of stay     Answer:   Inpatient Only Surgery       Vital Signs (last 3 days) before discharge       Date/Time Temp Pulse Resp BP MAP (mmHg) SpO2 Calculated FIO2 (%) - Nasal Cannula Nasal Cannula O2 Flow Rate (L/min) O2 Device Patient Position - Orthostatic VS Jamie Coma Scale Score Pain    08/14/24 1304 -- -- -- -- -- -- -- -- -- -- -- 9 08/14/24 1245 -- -- -- -- -- -- -- -- -- -- -- 9 08/14/24 1236 -- -- -- 140/93 121 -- -- -- -- -- -- --    08/14/24 1230 -- -- -- 139/86 110 -- -- -- -- Sitting - Orthostatic VS -- --    08/14/24 1228 -- -- -- -- -- -- -- -- -- -- -- 4    08/14/24 1226 -- 62 47 133/84 101 -- -- -- -- -- -- --    08/14/24 1225 -- -- -- 133/84 101 -- -- -- -- Lying - Orthostatic VS -- --    08/14/24 1206 -- 62 12 119/76 92 -- -- -- -- -- -- --    08/14/24 1136 -- 62 -- 134/75 99 98 % -- -- None (Room air) -- -- --    08/14/24 1106 -- 60 14 138/79 102 99 % -- -- -- -- -- --    08/14/24 1056 -- -- -- -- -- -- -- -- None (Room air) -- -- --    08/14/24 1046 -- 58 24 137/67 92 98 % -- -- None (Room air) -- --  --    08/14/24 1036 -- 66 26 136/75 102 100 % -- -- None (Room air) -- -- --    08/14/24 1026 -- 64 21 133/71 98 100 % -- -- None (Room air) -- -- --    08/14/24 1016 -- 72 22 125/69 104 100 % -- -- None (Room air) -- -- --    08/14/24 1007 -- -- -- -- -- -- -- -- -- -- -- 9    08/14/24 1006 -- 64 20 150/82 105 100 % -- -- None (Room air) -- -- --    08/14/24 0956 -- 62 10 139/75 105 100 % -- -- -- -- -- --    08/14/24 0946 -- 72 12 140/80 104 100 % -- -- -- -- -- --    08/14/24 0936 -- 68 15 148/85 93 100 % -- -- Nasal cannula -- -- 10 - Worst Possible Pain    08/14/24 0926 -- 68 14 126/73 93 100 % -- -- Nasal cannula -- -- --    08/14/24 0916 -- 76 16 126/83 105 100 % -- -- Nasal cannula -- -- --    08/14/24 0911 -- -- -- -- -- -- 28 2 L/min Nasal cannula -- -- 10 - Worst Possible Pain    08/14/24 0906 98 °F (36.7 °C) 90 10 153/82 104 100 % 28 2 L/min Nasal cannula -- 15 10 - Worst Possible Pain    08/14/24 0626 97.5 °F (36.4 °C) 60 16 137/78 -- 99 % -- -- None (Room air) -- -- 1          Weight (last 2 days) before discharge       Date/Time Weight    08/14/24 0626 80.8 (178.13)            Pertinent Labs/Diagnostic Test Results:   Radiology:  XR knee right 1 or 2 views   Final Interpretation by Melody Crowell MD (08/14 1325)   TKA with components in expected position..         Computerized Assisted Algorithm (CAA) may have been used to analyze all applicable images.         Workstation performed: [id number]                     Results from last 7 days   Lab Units 08/14/24  0709   PTT seconds 25       Diet: advance as tolerated Regular diet   Mobility: Up with assistance, WBAT   DVT Prophylaxis:  SCD's    Medications/Pain Control:   Scheduled Medications:  acetaminophen, 975 mg, Oral, Q8H  ascorbic acid, 500 mg, Oral, BID  aspirin, 81 mg, Oral, BID  bupivacaine liposomal, 20 mL, Perineural, Once  cefazolin, 2,000 mg, Intravenous, Q8H  docusate sodium, 100 mg, Oral, BID  folic acid, 1 mg, Oral, Daily  gabapentin,  300 mg, Oral, HS  pantoprazole, 40 mg, Oral, Daily  senna, 1 tablet, Oral, Daily      Continuous IV Infusions:  lactated ringers, 100 mL/hr, Intravenous, Continuous post -procedure   lactated ringers, 125 mL/hr, Intravenous, Continuous pre-procedure       PRN Meds:  acetaminophen, 650 mg, Oral, Q4H PRN  calcium carbonate, 1,000 mg, Oral, Daily PRN  chlorhexidine gluconate, , Topical, Daily PRN  lactated ringers, 1,000 mL, Intravenous, Once PRN   And  lactated ringers, 1,000 mL, Intravenous, Once PRN  morphine injection, 2 mg, Intravenous, Q2H PRN  ondansetron, 4 mg, Intravenous, Q6H PRN  oxyCODONE, 10 mg, Oral, Q4H PRN   x 1   oxyCODONE, 5 mg, Oral, Q4H PRN  simethicone, 80 mg, Oral, 4x Daily PRN  sodium chloride, 1,000 mL, Intravenous, Once PRN   And  sodium chloride, 1,000 mL, Intravenous, Once PRN    VS q 1 hr  x 4, q 4 hr x 36 h then routine   Encourage cough dep breathe   Incentive spirometry    Neurovascular checks q4h x 36 h   BMP, CBC qam x 3 days     Network Utilization Review Department  ATTENTION: Please call with any questions or concerns to [phone] and carefully listen to the prompts so that you are directed to the right person. All voicemails are confidential.   For Discharge needs, contact Care Management DC Support Team at [phone] opt. [phone]  Send all requests for admission clinical reviews, approved or denied determinations and any other requests to dedicated fax number below belonging to the [facility] where the patient is receiving treatment. List of dedicated fax numbers for the Facilities:  FACILITY NAME UR FAX NUMBER   ADMISSION DENIALS (Administrative/Medical Necessity) [phone]   DISCHARGE SUPPORT TEAM (NETWORK) [phone]   PARENT CHILD HEALTH (Maternity/NICU/Pediatrics) [phone]   [facility] [phone]   [facility] [phone]   [facility] [phone]   [facility]  [phone]   [facility] [phone]   [facility] [phone]   [facility] [phone]   [facility] [phone]   [facility] [phone]   [facility] [phone]   [facility] [phone]   [facility] [phone]

## 2024-08-14 NOTE — ANESTHESIA PROCEDURE NOTES
Peripheral Block    Patient location during procedure: holding area  Start time: 8/14/2024 7:10 AM  Reason for block: at surgeon's request and post-op pain management  Staffing  Performed by: Raghavendra Boyer DO  Authorized by: Raghavendra Boyer DO    Preanesthetic Checklist  Completed: patient identified, IV checked, site marked, risks and benefits discussed, surgical consent, monitors and equipment checked, pre-op evaluation and timeout performed  Peripheral Block  Patient position: supine  Prep: ChloraPrep  Patient monitoring: continuous pulse oximetry, frequent blood pressure checks and heart rate  Block type: Adductor Canal  Laterality: right  Injection technique: single-shot  Procedures: ultrasound guided, Ultrasound guidance required for the procedure to increase accuracy and safety of medication placement and decrease risk of complications.  Ultrasound permanent image saved  bupivacaine (PF) (MARCAINE) 0.25 % injection 20 mL - Perineural   10 mL - 8/14/2024 7:15:00 AM  bupivacaine liposomal (EXPAREL) 1.3 % injection 20 mL - Perineural   10 mL - 8/14/2024 7:15:00 AM  midazolam (VERSED) injection 0.5 mg - Intravenous   2 mg - 8/14/2024 7:11:00 AM  fentanyl citrate (PF) 100 MCG/2ML 50 mcg - Intravenous   50 mcg - 8/14/2024 7:11:00 AM  Needle  Needle type: Stimuplex   Needle gauge: 20 G  Needle length: 4 in  Needle localization: ultrasound guidance  Catheter type: closed end  Assessment  Injection assessment: frequent aspiration, injected with ease, needle tip visualized at all times, incremental injection, negative aspiration, negative for heart rate change, no paresthesia on injection and no symptoms of intraneural/intravenous injection  Paresthesia pain: none  Post-procedure:  site cleaned  patient tolerated the procedure well with no immediate complications

## 2024-08-14 NOTE — INTERVAL H&P NOTE
H&P reviewed. After examining the patient I find no changes in the patients condition since the H&P had been written. Plan for right TKA revision.

## 2024-08-14 NOTE — PHYSICAL THERAPY NOTE
"                                                                                  PHYSICAL THERAPY EVAL/TX  Physical Therapy Evaluation    Performed at least 2 patient identifiers during session:  Patient Active Problem List   Diagnosis    Sprain of lateral collateral ligament of right knee, initial encounter    Pain due to total right knee replacement (HCC)    Disease of thyroid gland       Past Medical History:   Diagnosis Date    Disease of thyroid gland        Past Surgical History:   Procedure Laterality Date    CHOLECYSTECTOMY      HYSTERECTOMY      KNEE SURGERY            08/14/24 1245   PT Last Visit   PT Visit Date 08/14/24   Note Type   Note type Evaluation   Pain Assessment   Pain Assessment Tool 0-10   Pain Score 9  (Initially 4/10. Increased with mobility)   Pain Location/Orientation Orientation: Right;Location: Knee   Hospital Pain Intervention(s) Repositioned;Ambulation/increased activity   Restrictions/Precautions   Weight Bearing Precautions Per Order Yes   RLE Weight Bearing Per Order WBAT   Other Precautions Pain;Fall Risk;Telemetry;Multiple lines;WBS   Home Living   Type of Home House   Home Layout One level  (5-6 ROSA with 2 rails)   Bathroom Shower/Tub Tub/shower unit   Home Equipment Walker;Cane   Additional Comments Was not using an AD prior to admission   Prior Function   Level of [place] Independent with ADLs;Independent with functional mobility;Independent with IADLS   Lives With Other (Comment)  (Children and fiance)   Receives Help From Family   IADLs Independent with driving;Independent with meal prep;Independent with medication management   General   Family/Caregiver Present Yes   Cognition   Overall Cognitive Status WFL   Arousal/Participation Alert   Orientation Level Oriented X4   Memory Within functional limits   Following Commands Follows one step commands without difficulty   Subjective   Subjective \"We need to leave to  my daughter from work.\"   LLE Assessment   LLE " Assessment WFL   Bed Mobility   Supine to Sit 5  Supervision   Additional Comments Orthostatic vitals taken-stable   Transfers   Sit to Stand 6  Modified independent   Additional items Armrests   Stand to Sit 6  Modified independent   Additional items Bedrails;Armrests   Ambulation/Elevation   Gait pattern Step to;Decreased R stance;Antalgic   Gait Assistance 5  Supervision   Additional items Verbal cues   Assistive Device Rolling walker   Distance 150ft   Balance   Static Sitting Normal   Dynamic Sitting Normal   Static Standing Good   Dynamic Standing Good   Ambulatory Good   Endurance Deficit   Endurance Deficit Yes   Endurance Deficit Description Limited by pain   Activity Tolerance   Activity Tolerance Patient limited by pain   Medical Staff Made Aware Tess IBARRA   Nurse Made Aware RNMariana   Assessment   Prognosis Good   Problem List Decreased range of motion;Decreased strength;Decreased endurance;Impaired balance;Decreased mobility;Obesity;Pain   Assessment Patient is a 47y/o F POD 0 R TKA revision. Patient resides with fiance and children in a 1SH with steps to enter. She is independent at baseline. Current medical status includes multiple lines, telemetry, pain, swelling, fall risk, decreased mobility and balance. Patient was received supine in bed. Orthostatic vitals negative. High pain levels with activity. Patient initially was at a supervision level for all mobility and progressed to a mod I level during session. She ambulated further than a household distance and completed a stair trial Good support from family. No further inpatient P.T. needs. Level 3 resources. Low intensity. The patient's AM-PAC Basic Mobility Inpatient Short Form Raw Score is 24. A Raw score of greater than 17 suggests the patient may benefit from discharge to home. Please also refer to the recommendation of the Physical Therapist for safe discharge planning.   Barriers to Discharge None   Goals   Patient Goals To go home now   PT  Treatment Day 1   Plan   Treatment/Interventions   (D/C P.T.)   Discharge Recommendation   Rehab Resource Intensity Level, PT III (Minimum Resource Intensity)   Additional Comments Owns a RW   AM-PAC Basic Mobility Inpatient   Turning in Flat Bed Without Bedrails 4   Lying on Back to Sitting on Edge of Flat Bed Without Bedrails 4   Moving Bed to Chair 4   Standing Up From Chair Using Arms 4   Walk in Room 4   Climb 3-5 Stairs With Railing 4   Basic Mobility Inpatient Raw Score 24   Basic Mobility Standardized Score 57.68   [facility] Highest Level Of Mobility   JH-HLM Goal 8: Walk 250 feet or more   JH-HLM Achieved 7: Walk 25 feet or more   Additional Treatment Session   Start Time 1236   End Time 1245   Treatment Assessment Performed toilet transfer mod I level. Ambulated 150ft with a RW mod I level. Progressing to a step through pattern. Antalgic gait with decreased stance on the RLE. Ascended/descended 1 step with 2 rails mod I level. Verbal cues for technique   Equipment Use RW   End of Consult   Patient Position at End of Consult Seated edge of bed;All needs within reach     Sunitha Blair, PT             Patient Name: Dahlia Encinas  Today's Date: 8/14/2024

## 2024-08-15 ENCOUNTER — TELEPHONE (OUTPATIENT)
Dept: OBGYN CLINIC | Facility: HOSPITAL | Age: 49
End: 2024-08-15

## 2024-08-16 ENCOUNTER — OFFICE VISIT (OUTPATIENT)
Age: 49
End: 2024-08-16
Payer: COMMERCIAL

## 2024-08-16 DIAGNOSIS — Z96.659 HISTORY OF REVISION OF TOTAL KNEE ARTHROPLASTY: ICD-10-CM

## 2024-08-16 DIAGNOSIS — G89.29 CHRONIC PAIN OF RIGHT KNEE: Primary | ICD-10-CM

## 2024-08-16 DIAGNOSIS — M25.661 STIFFNESS OF RIGHT KNEE: ICD-10-CM

## 2024-08-16 DIAGNOSIS — M25.561 CHRONIC PAIN OF RIGHT KNEE: Primary | ICD-10-CM

## 2024-08-16 PROCEDURE — 97164 PT RE-EVAL EST PLAN CARE: CPT

## 2024-08-16 PROCEDURE — 97110 THERAPEUTIC EXERCISES: CPT

## 2024-08-16 PROCEDURE — 97112 NEUROMUSCULAR REEDUCATION: CPT

## 2024-08-16 PROCEDURE — 97140 MANUAL THERAPY 1/> REGIONS: CPT

## 2024-08-16 NOTE — TELEPHONE ENCOUNTER
Patient contacted for a postoperative follow up assessment. Patient states current pain level of a 6/10 when sitting and 8/10 when walking with crutches. Patient denies increase in swelling and dressing is Dressing C/D/I Patient isicing the site regularly. NN educated patient on post-op bruising, swelling, and icing. PT 8/16 at 11AM.      We reviewed patients AVS medication list. Patient is taking Tylenol 1000mg every 8 hours, Oxycodone 5mg PRN, and ASA 81mg BID.Patient has had a BM but ispassing gas.       Patient denies nausea, vomiting, abdominal pain, chest pain, shortness of breath, fever, dizziness, and calf pain. Patient confirmed post-op appointment with surgeon on PT 8/30 at 10AM .Patient does not have any other questions or concerns at this time. Pt was encouraged to call with any questions, concerns or issues.  .

## 2024-08-16 NOTE — PROGRESS NOTES
First Post-Op     Today's date: 2024  Patient name: Dahlia Encinas  : 1975  MRN: [id number]  Referring provider: Cata Cintron PA-C  Dx:   Encounter Diagnosis     ICD-10-CM    1. Chronic pain of right knee  M25.561     G89.29       2. Stiffness of right knee  M25.661       3. History of revision of total knee arthroplasty  Z96.659           Start Time: 1105  Stop Time: 1150  Total time in clinic (min): 45 minutes    Subjective: R TKA revision 2024.  Pain intensity 9/10 - global R knee, incision site.  Difficulty with all functional tasks - transfers, bed mobility.        Objective: See treatment diary below    Range of Motion: Goniometric revealed the following findings (in degrees).  Joint Motion Right: Left:   Knee Extension -25 0   Knee Flexion 65 140     Strength: MMT revealed the following findings.  Joint Motion Right: Left:   Hip Flexion 1/5 4+/5   Hip Abduction 1/5 4+/5   Hip Adduction 1/5 4+/5   Hip Extension 1/5 4+/5   Knee Extension 1/5 5/5   Knee Flexion 1/5 5/5   Ankle Plantarflexion 3/5 4+/5   Ankle Dorsiflexion 3/5 4+/5     Additional Assessments:  Palpation: maximal TTP R knee globally - most pain medially and around incision site  Observation: wearing bandage  Gait Pattern: antalgic, use of crutches  Balance: impaired  TU.31 seconds w/ UE assistance + crutches    Assessment: Tolerated treatment poor. Patient demonstrated fatigue post treatment and would benefit from continued PT.  1:1 with Oscar Lopes DPT entirety of tx.    Impairments: abnormal coordination, abnormal gait, abnormal muscle firing, abnormal muscle tone, abnormal or restricted ROM, activity intolerance, impaired balance, impaired physical strength, lacks appropriate home exercise program, pain with function, weight-bearing intolerance, poor posture  and poor body mechanics  Functional limitations: prolonged standing/walking; ADLs - cooking, cleaning, dressing, bathing; stair negotiation  Symptom  irritability: high    Assessment details: Dahlia Encinas is a 48 y.o. female presenting with R knee pain post-operatively to R TKA revision on 8/14/2024.  Primary impairments include chronic R knee pain with functional activities, R hip and knee weakness, R knee flexion and extension AROM dysfunction, quadriceps motor control dysfunction.  Educated pt on anatomy and physiology of diagnosis.  Will benefit from skilled PT interventions for community reintegration, ADL management/independence, return to work/sport/hobbies.  Provided pt with written home exercise program to be completed daily.    Understanding of Dx/Px/POC: good     Prognosis: fair    Goals    Short Term Goals:  In 6 weeks, the patient will:  1. Improve R knee extension ROM to at least -10 degrees  2. Improve R knee flexion ROM to at least 100 degrees  3. Supervision with HEP for self-care    Long Term Goals:  In 12 weeks, the patient will:  1. Improve R hip/knee strength to at least 4/5 MMT  2. FOTO to greater than predicted value  3. Independent with HEP for self-care    Plan: Continue per plan of care.  Progress treatment as tolerated.      Patient would benefit from: skilled physical therapy  Planned modality interventions: manual electrical stimulation    Planned therapy interventions: abdominal trunk stabilization, activity modification, balance, balance/weight bearing training, behavior modification, body mechanics training, community reintegration, coordination, fine motor coordination training, flexibility, functional ROM exercises, gait training, graded activity, graded exercise, graded motor, home exercise program, work reintegration, therapeutic training, therapeutic exercise, therapeutic activities, stretching, strengthening, self care, postural training, patient education, neuromuscular re-education, motor coordination training, massage, manual therapy, joint mobilization and ADL training    Frequency: 2-3x week  Duration in weeks:  "12  Plan of Care beginning date: 7/22/2024  Plan of Care expiration date: 10/14/2024  Treatment plan discussed with: patient     Precautions: hx of many R knee surgeries - (most recent R TKA 1/2021 and R TKA revision 12/2021); 2nd R TKA revision 8/14/2024    POC expires Unit limit Auth Expiration date PT/OT + Visit Limit?   10/14/24 BOMN Pending BOMN     Visit/Unit Tracking  AUTH Status:  Date 7/22 8/16       Pending Used 1 2        Remaining             Pertinent Findings:                                                                                            Test / Measure  7/22/2024 8/16/2024   FOTO (Predicted 34) Post-Op 1   TUG 8.79s w/ UE push off 38.31s w/ UE and crutches   R hip flexion MMT 3+/5 1/5   R knee flex/ext MMT 3+/5 1/5   R knee ROM -19 ext  80 flex -25 ext  65 flex         Manuals 7/22 8/16   R knee PROM, stretching  MM 10'                  Neuro Re-Ed     Quad sets  15x5\"   Heel slides  15x strap   Mini squats                         Ther Ex     HEP review / edu 10' 10'   Rec bike  5' rock   SAQ     LAQ  unable   HR/TR     Supine gastroc S  3x30\"   STS     FSU     LSU                    Ther Activity                                   Gait Training               Modalities                    "

## 2024-08-17 LAB
BACTERIA SPEC ANAEROBE CULT: NO GROWTH
BACTERIA TISS AEROBE CULT: NO GROWTH
GRAM STN SPEC: ABNORMAL
GRAM STN SPEC: NORMAL
GRAM STN SPEC: NORMAL

## 2024-08-19 LAB
FUNGUS SPEC CULT: NORMAL

## 2024-08-20 ENCOUNTER — OFFICE VISIT (OUTPATIENT)
Age: 49
End: 2024-08-20
Payer: COMMERCIAL

## 2024-08-20 DIAGNOSIS — G89.29 CHRONIC PAIN OF RIGHT KNEE: Primary | ICD-10-CM

## 2024-08-20 DIAGNOSIS — M25.561 CHRONIC PAIN OF RIGHT KNEE: Primary | ICD-10-CM

## 2024-08-20 DIAGNOSIS — M25.661 STIFFNESS OF RIGHT KNEE: ICD-10-CM

## 2024-08-20 DIAGNOSIS — Z96.659 HISTORY OF REVISION OF TOTAL KNEE ARTHROPLASTY: ICD-10-CM

## 2024-08-20 PROCEDURE — 97112 NEUROMUSCULAR REEDUCATION: CPT

## 2024-08-20 PROCEDURE — 97110 THERAPEUTIC EXERCISES: CPT

## 2024-08-20 PROCEDURE — 97140 MANUAL THERAPY 1/> REGIONS: CPT

## 2024-08-20 NOTE — PROGRESS NOTES
"Daily Note     Today's date: 2024  Patient name: Dahlia Encinas  : 1975  MRN: [id number]  Referring provider: Cata Cintron PA-C  Dx:   Encounter Diagnosis     ICD-10-CM    1. Chronic pain of right knee  M25.561     G89.29       2. Stiffness of right knee  M25.661       3. History of revision of total knee arthroplasty  Z96.659           Start Time: 1134  Stop Time: 1212  Total time in clinic (min): 38 minutes    Subjective: Pt reports compliance with HEP.  Continued thigh and global knee pain and soreness.  Utilizing heat and ice to alleviate pain.      Objective: See treatment diary below      Assessment: Tolerated treatment well. Patient would benefit from continued PT.  Discussed heat vs ice properties following TKA.  Knee ROM is improved since first post-op and pt more mobile.  Significant gait dysfunction and quad motor control deficit persists.  Hypersensitivity throughout global R knee to pressure.  Incision site healing well.  1:1 with Oscar Lopes DPT entirety of tx.        Plan: Continue per plan of care.  Progress treatment as tolerated.       Precautions: hx of many R knee surgeries - (most recent R TKA 2021 and R TKA revision 2021); 2nd R TKA revision 2024    POC expires Unit limit Auth Expiration date PT/OT + Visit Limit?   10/14/24 BOMN Pending BOMN     Visit/Unit Tracking  AUTH Status:  Date       Pending Used 1 2 3       Remaining             Pertinent Findings:                                                                                            Test / Measure  2024   FOTO (Predicted 34) Post-Op 1   TUG 8.79s w/ UE push off 38.31s w/ UE and crutches   R hip flexion MMT 3+/5 1/5   R knee flex/ext MMT 3+/5 1/5   R knee ROM -19 ext  80 flex -25 ext  65 flex         Manuals    R knee PROM, stretching  MM 10' MM 10'                     Neuro Re-Ed      Quad sets  15x5\" 20x5\"   Heel slides  15x strap 20x strap   Mini squats    " "  Weight shifting   15x5\"                     Ther Ex      HEP review / edu 10' 10'    Rec bike  5' rock 6' rock   SAQ      LAQ  unable 2x6   HR/TR   10x ea   Supine gastroc S  3x30\" 4x30\"   STS      FSU      LSU                        Ther Activity                                          Gait Training                  Modalities                         "

## 2024-08-22 ENCOUNTER — OFFICE VISIT (OUTPATIENT)
Age: 49
End: 2024-08-22
Payer: COMMERCIAL

## 2024-08-22 DIAGNOSIS — M25.661 STIFFNESS OF RIGHT KNEE: ICD-10-CM

## 2024-08-22 DIAGNOSIS — Z96.651 PAIN DUE TO TOTAL RIGHT KNEE REPLACEMENT, SUBSEQUENT ENCOUNTER: ICD-10-CM

## 2024-08-22 DIAGNOSIS — T84.84XD PAIN DUE TO TOTAL RIGHT KNEE REPLACEMENT, SUBSEQUENT ENCOUNTER: ICD-10-CM

## 2024-08-22 DIAGNOSIS — Z96.659 HISTORY OF REVISION OF TOTAL KNEE ARTHROPLASTY: ICD-10-CM

## 2024-08-22 DIAGNOSIS — G89.29 CHRONIC PAIN OF RIGHT KNEE: Primary | ICD-10-CM

## 2024-08-22 DIAGNOSIS — M25.561 CHRONIC PAIN OF RIGHT KNEE: Primary | ICD-10-CM

## 2024-08-22 PROCEDURE — 97112 NEUROMUSCULAR REEDUCATION: CPT

## 2024-08-22 PROCEDURE — 97140 MANUAL THERAPY 1/> REGIONS: CPT

## 2024-08-22 PROCEDURE — 97110 THERAPEUTIC EXERCISES: CPT

## 2024-08-22 NOTE — PROGRESS NOTES
Daily Note     Today's date: 2024  Patient name: Dahlia Encinas  : 1975  MRN: [id number]  Referring provider: Cata Cintron PA-C  Dx:   Encounter Diagnosis     ICD-10-CM    1. Chronic pain of right knee  M25.561     G89.29       2. Stiffness of right knee  M25.661       3. History of revision of total knee arthroplasty  Z96.659       4. Pain due to total right knee replacement, subsequent encounter  T84.84XD EKG 12 lead    Z96.651           Start Time: 1108  Stop Time: 1148  Total time in clinic (min): 40 minutes    Subjective: Pt reports R knee stiffness and pulling sensation.  Pain symptoms are slightly decreased.  Attempted walking at home without crutches - remains apprehensive.  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        Objective: See treatment diary below      Assessment: Tolerated treatment well. Patient would benefit from continued PT.  R knee mobility and quadriceps motor control is gradually improving.  Able to achieve 85 degrees knee flexion this visit.  Painful endfeel in both flexion and extension.  Fatigues quickly with physical activity.  1:1 with Oscar Lopes DPT entirety of tx.        Plan: Continue per plan of care.  Progress treatment as tolerated.       Precautions: hx of many R knee surgeries - (most recent R TKA 2021 and R TKA revision 2021); 2nd R TKA revision 2024    POC expires Unit limit Auth Expiration date PT/OT + Visit Limit?   10/14/24 BOMN Pending BOMN     Visit/Unit Tracking  AUTH Status:  Date      Pending Used 1 2 3 4      Remaining             Pertinent Findings:                                                                                            Test / Measure  2024   FOTO (Predicted 34) Post-Op 1   TUG 8.79s w/ UE push off 38.31s w/ UE and crutches   R hip flexion MMT 3+/5 1/5   R knee flex/ext MMT 3+/5 1/5   R knee ROM -19 ext  80 flex -25 ext  65 flex  "        Manuals 7/22 8/16 8/20 8/22   R knee PROM, stretching  MM 10' MM 10' MM 10'                        Neuro Re-Ed       Quad sets  15x5\" 20x5\" 20x5\"   Heel slides  15x strap 20x strap 20x strap   Mini squats    15x   Weight shifting   15x5\"    QS + SLR    Unable                 Ther Ex       HEP review / edu 10' 10'     Rec bike  5' rock 6' rock 6' rock   SAQ       LAQ  unable 2x6 2x8   HR/TR   10x ea 2x10 ea   Supine gastroc S  3x30\" 4x30\" 4x30\"   STS       FSU       LSU                            Ther Activity                                                 Gait Training       No AD    Shaheed UE on bar 2 laps          Modalities                              "

## 2024-08-26 LAB
FUNGUS SPEC CULT: NORMAL

## 2024-08-27 ENCOUNTER — OFFICE VISIT (OUTPATIENT)
Age: 49
End: 2024-08-27
Payer: COMMERCIAL

## 2024-08-27 DIAGNOSIS — G89.29 CHRONIC PAIN OF RIGHT KNEE: Primary | ICD-10-CM

## 2024-08-27 DIAGNOSIS — T84.84XD PAIN DUE TO TOTAL RIGHT KNEE REPLACEMENT, SUBSEQUENT ENCOUNTER: ICD-10-CM

## 2024-08-27 DIAGNOSIS — Z96.659 HISTORY OF REVISION OF TOTAL KNEE ARTHROPLASTY: ICD-10-CM

## 2024-08-27 DIAGNOSIS — M25.661 STIFFNESS OF RIGHT KNEE: ICD-10-CM

## 2024-08-27 DIAGNOSIS — M25.561 CHRONIC PAIN OF RIGHT KNEE: Primary | ICD-10-CM

## 2024-08-27 DIAGNOSIS — Z96.651 PAIN DUE TO TOTAL RIGHT KNEE REPLACEMENT, SUBSEQUENT ENCOUNTER: ICD-10-CM

## 2024-08-27 PROCEDURE — 97140 MANUAL THERAPY 1/> REGIONS: CPT

## 2024-08-27 PROCEDURE — 97110 THERAPEUTIC EXERCISES: CPT

## 2024-08-27 PROCEDURE — 97112 NEUROMUSCULAR REEDUCATION: CPT

## 2024-08-27 NOTE — PROGRESS NOTES
Daily Note     Today's date: 2024  Patient name: Dahlia Encinas  : 1975  MRN: [id number]  Referring provider: Cata Cintron PA-C  Dx:   Encounter Diagnosis     ICD-10-CM    1. Chronic pain of right knee  M25.561     G89.29       2. Stiffness of right knee  M25.661       3. History of revision of total knee arthroplasty  Z96.659       4. Pain due to total right knee replacement, subsequent encounter  T84.84XD     Z96.651           Start Time: 1035  Stop Time: 1130  Total time in clinic (min): 55 minutes    Subjective: Pt reports starting work yesterday and worked this morning.  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  Is hoping to work every other day due to recency of TKA.  Has been having calf tightness recently.      Objective: See treatment diary below      Assessment: Tolerated treatment well. Patient would benefit from continued PT.  Pt with improvement of R knee mobility:  96 degrees flexion, -14 degrees extension with minimal overpressure.  Pt with painful end feel in both planes of movement.  Pain limited functionality.  Negative Shawn's sign for DVT.  Utilized calf stretching to facilitate calf muscle length improvement.  1:1 with Oscar Lopes DPT entirety of tx.        Plan: Continue per plan of care.  Progress treatment as tolerated.       Precautions: hx of many R knee surgeries - (most recent R TKA 2021 and R TKA revision 2021); 2nd R TKA revision 2024    POC expires Unit limit Auth Expiration date PT/OT + Visit Limit?   10/14/24 BOMN Pending BOMN     Visit/Unit Tracking  AUTH Status:  Date     Pending Used 1 2 3 4 5     Remaining             Pertinent Findings:                                                                                            Test / Measure  2024   FOTO (Predicted 34) Post-Op 1   TUG 8.79s w/ UE push off 38.31s w/ UE and crutches   R hip flexion MMT 3+/5 1/5   R knee  "flex/ext MMT 3+/5 1/5   R knee ROM -19 ext  80 flex -25 ext  65 flex         Manuals 7/22 8/16 8/20 8/22 8/27   R knee PROM, stretching  MM 10' MM 10' MM 10' MM 15' + gastroc S                           Neuro Re-Ed        Quad sets  15x5\" 20x5\" 20x5\" 20x5\"   Heel slides  15x strap 20x strap 20x strap 20x strap   Mini squats    15x    Weight shifting   15x5\"     QS + SLR    Unable 2x5                   Ther Ex        HEP review / edu 10' 10'   5'   Rec bike  5' rock 6' rock 6' rock 6' rock   LAQ  unable 2x6 2x8 2x10   HR/TR   10x ea 2x10 ea 2x10 ea   Supine gastroc S  3x30\" 4x30\" 4x30\" 4x30\"   STS        FSU     2x10 4\"   LSU                                Ther Activity                                                        Gait Training        No AD    Shaheed UE on bar 2 laps            Modalities                                   "

## 2024-08-29 ENCOUNTER — OFFICE VISIT (OUTPATIENT)
Age: 49
End: 2024-08-29
Payer: COMMERCIAL

## 2024-08-29 DIAGNOSIS — G89.29 CHRONIC PAIN OF RIGHT KNEE: Primary | ICD-10-CM

## 2024-08-29 DIAGNOSIS — T84.84XD PAIN DUE TO TOTAL RIGHT KNEE REPLACEMENT, SUBSEQUENT ENCOUNTER: ICD-10-CM

## 2024-08-29 DIAGNOSIS — M25.561 CHRONIC PAIN OF RIGHT KNEE: Primary | ICD-10-CM

## 2024-08-29 DIAGNOSIS — M25.661 STIFFNESS OF RIGHT KNEE: ICD-10-CM

## 2024-08-29 DIAGNOSIS — Z96.651 PAIN DUE TO TOTAL RIGHT KNEE REPLACEMENT, SUBSEQUENT ENCOUNTER: ICD-10-CM

## 2024-08-29 DIAGNOSIS — Z96.659 HISTORY OF REVISION OF TOTAL KNEE ARTHROPLASTY: ICD-10-CM

## 2024-08-29 PROCEDURE — 97110 THERAPEUTIC EXERCISES: CPT

## 2024-08-29 PROCEDURE — 97140 MANUAL THERAPY 1/> REGIONS: CPT

## 2024-08-29 PROCEDURE — 97112 NEUROMUSCULAR REEDUCATION: CPT

## 2024-08-29 NOTE — PROGRESS NOTES
Daily Note     Today's date: 2024  Patient name: Dahlia Encinas  : 1975  MRN: [id number]  Referring provider: Cata Cintron PA-C  Dx:   Encounter Diagnosis     ICD-10-CM    1. Chronic pain of right knee  M25.561     G89.29       2. Stiffness of right knee  M25.661       3. History of revision of total knee arthroplasty  Z96.659       4. Pain due to total right knee replacement, subsequent encounter  T84.84XD     Z96.651           Start Time: 1051  Stop Time: 1130  Total time in clinic (min): 39 minutes    Subjective: Pt reports slight improvement in R knee status.  Was able to play pool but had increase in swelling.  Continues to require use of single crutch at home and in the community.      Objective: See treatment diary below      Assessment: Tolerated treatment well. Patient would benefit from continued PT.  Pt able to tolerate gradual progression of planned therapeutic interventions this visit.  Following progressive overload principles.  Knee flexion PROM to 102 degrees, knee extension is improving as well but remains limited.  Mild discomfort with prolonged weight bearing on RLE.  1:1 with Oscar Lopes DPT entirety of tx.        Plan: Continue per plan of care.  Progress treatment as tolerated.       Precautions: hx of many R knee surgeries - (most recent R TKA 2021 and R TKA revision 2021); 2nd R TKA revision 2024    POC expires Unit limit Auth Expiration date PT/OT + Visit Limit?   10/14/24 BOMN Pending BOMN     Visit/Unit Tracking  AUTH Status:  Date    Pending Used 1 2 3 4 5 6    Remaining             Pertinent Findings:                                                                                            Test / Measure  2024   FOTO (Predicted 34) Post-Op 1   TUG 8.79s w/ UE push off 38.31s w/ UE and crutches   R hip flexion MMT 3+/5 1/5   R knee flex/ext MMT 3+/5 1/5   R knee ROM -19 ext  80 flex -25 ext  65 flex  "        Manuals 7/22 8/16 8/20 8/22 8/27 8/29   R knee PROM, stretching  MM 10' MM 10' MM 10' MM 15' + gastroc S MM 15' + gastroc S                              Neuro Re-Ed         Quad sets  15x5\" 20x5\" 20x5\" 20x5\" 20x5\"   Heel slides  15x strap 20x strap 20x strap 20x strap 20x5\" strap   Mini squats    15x     Weight shifting   15x5\"      QS + SLR    Unable 2x5 2x6                     Ther Ex         HEP review / edu 10' 10'   5'    Rec bike  5' rock 6' rock 6' rock 6' rock 8' rock   LAQ  unable 2x6 2x8 2x10 2x10   HR/TR   10x ea 2x10 ea 2x10 ea    Supine gastroc S  3x30\" 4x30\" 4x30\" 4x30\" 4x30\"   STS         FSU     2x10 4\" 2x10 4\"   LSU         Stand hip 3 way      15x ea B                     Ther Activity                                                               Gait Training         No AD    Shaheed UE on bar 2 laps              Modalities                                        "

## 2024-08-30 ENCOUNTER — OFFICE VISIT (OUTPATIENT)
Dept: OBGYN CLINIC | Facility: CLINIC | Age: 49
End: 2024-08-30

## 2024-08-30 ENCOUNTER — APPOINTMENT (OUTPATIENT)
Dept: RADIOLOGY | Facility: CLINIC | Age: 49
End: 2024-08-30
Payer: COMMERCIAL

## 2024-08-30 VITALS
WEIGHT: 178 LBS | DIASTOLIC BLOOD PRESSURE: 73 MMHG | BODY MASS INDEX: 30.39 KG/M2 | HEIGHT: 64 IN | SYSTOLIC BLOOD PRESSURE: 110 MMHG | HEART RATE: 80 BPM

## 2024-08-30 DIAGNOSIS — T84.84XD PAIN DUE TO TOTAL RIGHT KNEE REPLACEMENT, SUBSEQUENT ENCOUNTER: ICD-10-CM

## 2024-08-30 DIAGNOSIS — T84.84XD PAIN DUE TO TOTAL RIGHT KNEE REPLACEMENT, SUBSEQUENT ENCOUNTER: Primary | ICD-10-CM

## 2024-08-30 DIAGNOSIS — Z96.651 PAIN DUE TO TOTAL RIGHT KNEE REPLACEMENT, SUBSEQUENT ENCOUNTER: ICD-10-CM

## 2024-08-30 DIAGNOSIS — Z96.651 PAIN DUE TO TOTAL RIGHT KNEE REPLACEMENT, SUBSEQUENT ENCOUNTER: Primary | ICD-10-CM

## 2024-08-30 PROCEDURE — 73562 X-RAY EXAM OF KNEE 3: CPT

## 2024-08-30 PROCEDURE — 99024 POSTOP FOLLOW-UP VISIT: CPT | Performed by: STUDENT IN AN ORGANIZED HEALTH CARE EDUCATION/TRAINING PROGRAM

## 2024-08-30 NOTE — LETTER
August 30, 2024     Patient: Dahlia Encinas  YOB: 1975  Date of Visit: 8/30/2024      To Whom it May Concern:    Dahlia Encinas is under my professional care. Dahlia was seen in my office on 8/30/2024. Dahlia may return to work 4 weeks post operatively (September 11th, 2024).    If you have any questions or concerns, please don't hesitate to call.         Sincerely,          Jarocho Aguirre,         CC: No Recipients

## 2024-08-30 NOTE — PROGRESS NOTES
Subjective:Patient seen and examined. Pain controlled. Progressing well. Incisiona without drainage. Denies fevers or chills.  Overall, she feels that she is doing better than prior to her surgery with regards to range of motion.  She was able to achieve approximate 100 degrees of knee flexion in physical therapy thus far.    Physical Exam:  Incision: Clean, dry and intact  ROM: 3-100 degrees  5/5 IP/Q/HS/TA/GS, 2+ DP/PT, SILT DP/SP/S/S/TN    XR right knee: Demonstrates stable and appropriately positioned revision arthroplasty of the patellar component. Cemented PS nexgen.     Assessment/Plan:  2 weeks s/p right revision arthroplasty of patella due to overstuffing.     - continue multi-modal pain control   - Weight bearing status: As tolerated  - DVT ppx: 4 more weeks  - Incision care: May wash and allow water and soap to run over and pat dry no immersing for another 2 weeks  - PT/OT  -May return to work as a  4 weeks postoperatively.  Provided a note dictated today  - F/U 4 weeks    Scribe Attestation      I,:  Tello Adair am acting as a scribe while in the presence of the attending physician.:       I,:  Jarocho Aguirre, DO personally performed the services described in this documentation    as scribed in my presence.:

## 2024-09-03 ENCOUNTER — APPOINTMENT (OUTPATIENT)
Age: 49
End: 2024-09-03
Payer: COMMERCIAL

## 2024-09-03 LAB
FUNGUS SPEC CULT: NORMAL

## 2024-09-04 DIAGNOSIS — T84.84XD PAIN DUE TO TOTAL RIGHT KNEE REPLACEMENT, SUBSEQUENT ENCOUNTER: ICD-10-CM

## 2024-09-04 DIAGNOSIS — Z96.651 PAIN DUE TO TOTAL RIGHT KNEE REPLACEMENT, SUBSEQUENT ENCOUNTER: ICD-10-CM

## 2024-09-04 RX ORDER — CHOLECALCIFEROL (VITAMIN D3) 25 MCG
2000 TABLET ORAL DAILY
Qty: 60 TABLET | Refills: 1 | Status: SHIPPED | OUTPATIENT
Start: 2024-09-04

## 2024-09-05 ENCOUNTER — OFFICE VISIT (OUTPATIENT)
Age: 49
End: 2024-09-05
Payer: COMMERCIAL

## 2024-09-05 DIAGNOSIS — T84.84XD PAIN DUE TO TOTAL RIGHT KNEE REPLACEMENT, SUBSEQUENT ENCOUNTER: ICD-10-CM

## 2024-09-05 DIAGNOSIS — M25.661 STIFFNESS OF RIGHT KNEE: ICD-10-CM

## 2024-09-05 DIAGNOSIS — Z96.659 HISTORY OF REVISION OF TOTAL KNEE ARTHROPLASTY: ICD-10-CM

## 2024-09-05 DIAGNOSIS — M25.561 CHRONIC PAIN OF RIGHT KNEE: Primary | ICD-10-CM

## 2024-09-05 DIAGNOSIS — Z96.651 PAIN DUE TO TOTAL RIGHT KNEE REPLACEMENT, SUBSEQUENT ENCOUNTER: ICD-10-CM

## 2024-09-05 DIAGNOSIS — G89.29 CHRONIC PAIN OF RIGHT KNEE: Primary | ICD-10-CM

## 2024-09-05 PROCEDURE — 97110 THERAPEUTIC EXERCISES: CPT

## 2024-09-05 PROCEDURE — 97140 MANUAL THERAPY 1/> REGIONS: CPT

## 2024-09-05 PROCEDURE — 97112 NEUROMUSCULAR REEDUCATION: CPT

## 2024-09-05 NOTE — PROGRESS NOTES
Daily Note     Today's date: 2024  Patient name: Dahlia Encinas  : 1975  MRN: [id number]  Referring provider: Cata Cintron PA-C  Dx:   Encounter Diagnosis     ICD-10-CM    1. Chronic pain of right knee  M25.561     G89.29       2. Stiffness of right knee  M25.661       3. History of revision of total knee arthroplasty  Z96.659       4. Pain due to total right knee replacement, subsequent encounter  T84.84XD     Z96.651           Start Time: 09  Stop Time: 1027  Total time in clinic (min): 38 minutes    Subjective: Pt reports aggravation of pain recently due to overuse with yard work and billiards.        Objective: See treatment diary below      Assessment: Tolerated treatment fair. Patient would benefit from continued PT.  Pt had difficulty tolerating tx session secondary to increase in pain symptoms.  Significant TTP at distal quads and patellar tendon.  Discussed PFPS with pt as it relates to her improvement in functionality, mobility, and activity.  Able to achieve 105 degrees knee flexion this visit.  Remains pain limited with overpressure PROM in both flexion and extension.  1:1 with Oscar Lopes DPT entirety of tx.        Plan: Continue per plan of care.  Progress treatment as tolerated.       Precautions: hx of many R knee surgeries - (most recent R TKA 2021 and R TKA revision 2021); 2nd R TKA revision 2024    POC expires Unit limit Auth Expiration date PT/OT + Visit Limit?   10/14/24 BOMN N/A BOMN     Visit/Unit Tracking  AUTH Status:  Date    N/A Used 1 2 3 4 5 6 7    Remaining              Pertinent Findings:                                                                                            Test / Measure  2024  Pre-Op 2024  Post-Op   FOTO (Predicted 34) N/A 1   TUG 8.79s w/ UE push off 38.31s w/ UE and crutches   R hip flexion MMT 3+/5 1/5   R knee flex/ext MMT 3+/5 1/5   R knee ROM -19 ext  80 flex -25 ext  65 flex  "        Manuals 7/22 8/16 8/20 8/22 8/27 8/29 9/5   R knee PROM, stretching  MM 10' MM 10' MM 10' MM 15' + gastroc S MM 15' + gastroc S MM 10'                                 Neuro Re-Ed          Quad sets  15x5\" 20x5\" 20x5\" 20x5\" 20x5\" 20x5\"   Heel slides  15x strap 20x strap 20x strap 20x strap 20x5\" strap 10x5\" strap p!   Mini squats    15x      Weight shifting   15x5\"       QS + SLR    Unable 2x5 2x6 2x7                       Ther Ex          HEP review / edu 10' 10'   5'     Rec bike  5' rock 6' rock 6' rock 6' rock 8' rock 8' rock   LAQ  unable 2x6 2x8 2x10 2x10 2x10 1#   HR/TR   10x ea 2x10 ea 2x10 ea     Supine gastroc S  3x30\" 4x30\" 4x30\" 4x30\" 4x30\" 4x30\"   STS          FSU     2x10 4\" 2x10 4\" 2x10 4\"   LSU          Stand hip 3 way      15x ea B 15x ea B                       Ther Activity                                                                      Gait Training          No AD    Shaheed UE on bar 2 laps                Modalities                                             "

## 2024-09-06 DIAGNOSIS — T84.84XA PAIN DUE TO TOTAL RIGHT KNEE REPLACEMENT, INITIAL ENCOUNTER (HCC): ICD-10-CM

## 2024-09-06 DIAGNOSIS — Z96.651 PAIN DUE TO TOTAL RIGHT KNEE REPLACEMENT, INITIAL ENCOUNTER (HCC): ICD-10-CM

## 2024-09-06 RX ORDER — ASPIRIN 81 MG/1
81 TABLET, COATED ORAL 2 TIMES DAILY
Qty: 60 TABLET | Refills: 0 | Status: SHIPPED | OUTPATIENT
Start: 2024-09-06

## 2024-09-08 DIAGNOSIS — Z96.651 PAIN DUE TO TOTAL RIGHT KNEE REPLACEMENT, SUBSEQUENT ENCOUNTER: ICD-10-CM

## 2024-09-08 DIAGNOSIS — T84.84XD PAIN DUE TO TOTAL RIGHT KNEE REPLACEMENT, SUBSEQUENT ENCOUNTER: ICD-10-CM

## 2024-09-08 RX ORDER — FOLIC ACID 1 MG/1
1000 TABLET ORAL DAILY
Qty: 30 TABLET | Refills: 5 | Status: SHIPPED | OUTPATIENT
Start: 2024-09-08

## 2024-09-09 LAB
FUNGUS SPEC CULT: NORMAL

## 2024-09-10 ENCOUNTER — APPOINTMENT (OUTPATIENT)
Age: 49
End: 2024-09-10
Payer: COMMERCIAL

## 2024-09-12 ENCOUNTER — OFFICE VISIT (OUTPATIENT)
Age: 49
End: 2024-09-12
Payer: COMMERCIAL

## 2024-09-12 DIAGNOSIS — M25.661 STIFFNESS OF RIGHT KNEE: ICD-10-CM

## 2024-09-12 DIAGNOSIS — M25.561 CHRONIC PAIN OF RIGHT KNEE: Primary | ICD-10-CM

## 2024-09-12 DIAGNOSIS — Z96.659 HISTORY OF REVISION OF TOTAL KNEE ARTHROPLASTY: ICD-10-CM

## 2024-09-12 DIAGNOSIS — Z96.651 PAIN DUE TO TOTAL RIGHT KNEE REPLACEMENT, SUBSEQUENT ENCOUNTER: ICD-10-CM

## 2024-09-12 DIAGNOSIS — G89.29 CHRONIC PAIN OF RIGHT KNEE: Primary | ICD-10-CM

## 2024-09-12 DIAGNOSIS — T84.84XD PAIN DUE TO TOTAL RIGHT KNEE REPLACEMENT, SUBSEQUENT ENCOUNTER: ICD-10-CM

## 2024-09-12 PROCEDURE — 97110 THERAPEUTIC EXERCISES: CPT

## 2024-09-12 PROCEDURE — 97140 MANUAL THERAPY 1/> REGIONS: CPT

## 2024-09-12 NOTE — PROGRESS NOTES
"Daily Note     Today's date: 2024  Patient name: Dahlia Encinas  : 1975  MRN: [id number]  Referring provider: Cata Cintron PA-C  Dx:   Encounter Diagnosis     ICD-10-CM    1. Chronic pain of right knee  M25.561     G89.29       2. Stiffness of right knee  M25.661       3. History of revision of total knee arthroplasty  Z96.659       4. Pain due to total right knee replacement, subsequent encounter  T84.84XD     Z96.651           Start Time: 1743  Stop Time:   Total time in clinic (min): 27 minutes    Subjective: Pt reports aggravation of R knee status this week as she \"hyperextended\" knee in parking lot.  Continues to have significant R knee pain.        Objective: See treatment diary below      Assessment: Tolerated treatment well. Patient would benefit from continued PT.  Pt notes pain inferior to patella likely indicating potential PFPS.  Discussed PFPS in relation to TKA.  R knee remains maximally TTP.  Pain in both flexion and extension PROM - difficulty tolerating any overpressure.  Global R knee hyperalgesia present.  1:1 with Oscar Lopes DPT entirety of tx.        Plan: Continue per plan of care.  Progress treatment as tolerated.       Precautions: hx of many R knee surgeries - (most recent R TKA 2021 and R TKA revision 2021); 2nd R TKA revision 2024    POC expires Unit limit Auth Expiration date PT/OT + Visit Limit?   10/14/24 BOMN N/A BOMN     Visit/Unit Tracking  AUTH Status:  Date    N/A Used 1 2 3 4 5 6 7 8    Remaining               Pertinent Findings:                                                                                            Test / Measure  2024  Pre-Op 2024  Post-Op   FOTO (Predicted 34) N/A 1   TUG 8.79s w/ UE push off 38.31s w/ UE and crutches   R hip flexion MMT 3+/5 1/5   R knee flex/ext MMT 3+/5 1/5   R knee ROM -19 ext  80 flex -25 ext  65 flex         Manuals  " "9/12   R knee PROM, stretching  MM 10' MM 10' MM 10' MM 15' + gastroc S MM 15' + gastroc S MM 10' MM 10'                                    Neuro Re-Ed           Quad sets  15x5\" 20x5\" 20x5\" 20x5\" 20x5\" 20x5\"    Heel slides  15x strap 20x strap 20x strap 20x strap 20x5\" strap 10x5\" strap p! 20x5\" strap   Mini squats    15x       Weight shifting   15x5\"        QS + SLR    Unable 2x5 2x6 2x7 2x8                         Ther Ex           HEP review / edu 10' 10'   5'      Rec bike  5' rock 6' rock 6' rock 6' rock 8' rock 8' rock 8' rock   LAQ  unable 2x6 2x8 2x10 2x10 2x10 1# 2x10 2#   HR/TR   10x ea 2x10 ea 2x10 ea      Supine gastroc S  3x30\" 4x30\" 4x30\" 4x30\" 4x30\" 4x30\" 4x30\"   STS           FSU     2x10 4\" 2x10 4\" 2x10 4\" 2x10 6\"   LSU           Stand hip 3 way      15x ea B 15x ea B 15x ea B                         Ther Activity                                                                             Gait Training           No AD    Shaheed UE on bar 2 laps                  Modalities                                                  "

## 2024-09-16 ENCOUNTER — PATIENT MESSAGE (OUTPATIENT)
Dept: OBGYN CLINIC | Facility: CLINIC | Age: 49
End: 2024-09-16

## 2024-09-16 LAB
FUNGUS SPEC CULT: NORMAL

## 2024-09-19 ENCOUNTER — OFFICE VISIT (OUTPATIENT)
Age: 49
End: 2024-09-19
Payer: COMMERCIAL

## 2024-09-19 DIAGNOSIS — Z96.651 PAIN DUE TO TOTAL RIGHT KNEE REPLACEMENT, SUBSEQUENT ENCOUNTER: ICD-10-CM

## 2024-09-19 DIAGNOSIS — M25.561 CHRONIC PAIN OF RIGHT KNEE: Primary | ICD-10-CM

## 2024-09-19 DIAGNOSIS — G89.29 CHRONIC PAIN OF RIGHT KNEE: Primary | ICD-10-CM

## 2024-09-19 DIAGNOSIS — T84.84XD PAIN DUE TO TOTAL RIGHT KNEE REPLACEMENT, SUBSEQUENT ENCOUNTER: ICD-10-CM

## 2024-09-19 DIAGNOSIS — M25.661 STIFFNESS OF RIGHT KNEE: ICD-10-CM

## 2024-09-19 DIAGNOSIS — Z96.659 HISTORY OF REVISION OF TOTAL KNEE ARTHROPLASTY: ICD-10-CM

## 2024-09-19 PROCEDURE — 97140 MANUAL THERAPY 1/> REGIONS: CPT | Performed by: PHYSICAL THERAPIST

## 2024-09-19 PROCEDURE — 97110 THERAPEUTIC EXERCISES: CPT | Performed by: PHYSICAL THERAPIST

## 2024-09-19 NOTE — PROGRESS NOTES
"Daily Note     Today's date: 2024  Patient name: Dahlia Encinas  : 1975  MRN: [id number]  Referring provider: Cata Cintron PA-C  Dx:   Encounter Diagnosis     ICD-10-CM    1. Chronic pain of right knee  M25.561     G89.29       2. Stiffness of right knee  M25.661       3. History of revision of total knee arthroplasty  Z96.659       4. Pain due to total right knee replacement, subsequent encounter  T84.84XD     Z96.651           Start Time: 1715  Stop Time: 1755  Total time in clinic (min): 40 minutes    Subjective: Felt pop and knee give out on  with fell from standing height.     Objective: See treatment diary below    Assessment: Tolerated treatment well. Patient demonstrated fatigue post treatment, exhibited good technique with therapeutic exercises, and would benefit from continued PT 1:1 with Ildefonso Knight DPT for 30mins of tx. Pt demonstrates no significant edema, TTP over PFJ. ROM progression maintained. Pt did independently     Plan: Continue per plan of care.      Precautions: hx of many R knee surgeries - (most recent R TKA 2021 and R TKA revision 2021); 2nd R TKA revision 2024    POC expires Unit limit Auth Expiration date PT/OT + Visit Limit?   10/14/24 BOMN N/A BOMN     Visit/Unit Tracking  AUTH Status:  Date    N/A Used 1 2 3 4 5 6 7 8 9    Remaining                Pertinent Findings:                                                                                            Test / Measure  2024  Pre-Op 2024  Post-Op   FOTO (Predicted 34) N/A 1   TUG 8.79s w/ UE push off 38.31s w/ UE and crutches   R hip flexion MMT 3+/5 1/5   R knee flex/ext MMT 3+/5 1/5   R knee ROM -19 ext  80 flex -25 ext  65 flex         Manuals    R knee PROM, stretching MM 15' + gastroc S MM 15' + gastroc S MM 10' MM 10' MM 10'                           Neuro Re-Ed        Quad sets 20x5\" 20x5\" 20x5\"     Heel " "slides 20x strap 20x5\" strap 10x5\" strap p! 20x5\" strap 20x5\" strap   Mini squats        Weight shifting        QS + SLR 2x5 2x6 2x7 2x8 3x10                   Ther Ex        HEP review / edu 5'       Rec bike 6' rock 8' rock 8' rock 8' rock 8' rock   LAQ 2x10 2x10 2x10 1# 2x10 2# 2x10 2#   HR/TR 2x10 ea       Supine gastroc S 4x30\" 4x30\" 4x30\" 4x30\" 4x30\"   STS        FSU 2x10 4\" 2x10 4\" 2x10 4\" 2x10 6\" 2x10 6\"   LSU     2x10 6\"   Stand hip 3 way  15x ea B 15x ea B 15x ea B 15x ea B                   Ther Activity                                                        Gait Training        No AD                Modalities                                           "

## 2024-09-26 ENCOUNTER — OFFICE VISIT (OUTPATIENT)
Age: 49
End: 2024-09-26
Payer: COMMERCIAL

## 2024-09-26 DIAGNOSIS — T84.84XD PAIN DUE TO TOTAL RIGHT KNEE REPLACEMENT, SUBSEQUENT ENCOUNTER: ICD-10-CM

## 2024-09-26 DIAGNOSIS — Z96.659 HISTORY OF REVISION OF TOTAL KNEE ARTHROPLASTY: ICD-10-CM

## 2024-09-26 DIAGNOSIS — G89.29 CHRONIC PAIN OF RIGHT KNEE: Primary | ICD-10-CM

## 2024-09-26 DIAGNOSIS — Z96.651 PAIN DUE TO TOTAL RIGHT KNEE REPLACEMENT, SUBSEQUENT ENCOUNTER: ICD-10-CM

## 2024-09-26 DIAGNOSIS — M25.561 CHRONIC PAIN OF RIGHT KNEE: Primary | ICD-10-CM

## 2024-09-26 DIAGNOSIS — M25.661 STIFFNESS OF RIGHT KNEE: ICD-10-CM

## 2024-09-26 PROCEDURE — 97140 MANUAL THERAPY 1/> REGIONS: CPT

## 2024-09-26 PROCEDURE — 97110 THERAPEUTIC EXERCISES: CPT

## 2024-09-26 PROCEDURE — 97112 NEUROMUSCULAR REEDUCATION: CPT

## 2024-09-26 NOTE — PROGRESS NOTES
Daily Note     Today's date: 2024  Patient name: Dahlia Encinas  : 1975  MRN: [id number]  Referring provider: Cata Cintron PA-C  Dx:   Encounter Diagnosis     ICD-10-CM    1. Chronic pain of right knee  M25.561     G89.29       2. Stiffness of right knee  M25.661       3. History of revision of total knee arthroplasty  Z96.659       4. Pain due to total right knee replacement, subsequent encounter  T84.84XD     Z96.651           Start Time: 1730  Stop Time: 1810  Total time in clinic (min): 40 minutes    Subjective: Pt reports mild soreness and stiffness prior to start of tx session.        Objective: See treatment diary below    110 degrees knee flexion    Assessment: Tolerated treatment well. Patient would benefit from continued PT.  Empty/painful end feel with PROM/stretching into both flexion and extension.  Moderate aggravation of pain symptoms following PROM and overpressure.  Pt able to perform full revolutions on the recumbent bike for the 1st time.  Functionality is gradually improving but pain intensity persists.  Unable to perform STS transfer at chair height without use of BUEs - utilized increased seat height to perform.  1:1 with Oscar Lopes DPT entirety of tx.        Plan: Continue per plan of care.  Progress treatment as tolerated.       Precautions: hx of many R knee surgeries - (most recent R TKA 2021 and R TKA revision 2021); 2nd R TKA revision 2024    POC expires Unit limit Auth Expiration date PT/OT + Visit Limit?   10/14/24 BOMN N/A BOMN     Visit/Unit Tracking  AUTH Status:  Date 8/20 8/22 8/26 8/29 9/5 9/12 9/19 9/26   N/A Used 3 4 5 6 7 8 9 10    Remaining               Pertinent Findings:                                                                                            Test / Measure  2024  Pre-Op 2024  Post-Op 2024   FOTO (Predicted 34) N/A 1 60   TUG 8.79s w/ UE push off 38.31s w/ UE and crutches    R hip flexion MMT 3+/5 1/5    R  "knee flex/ext MMT 3+/5 1/5    R knee ROM -19 ext  80 flex -25 ext  65 flex          Manuals 8/27 8/29 9/5 9/12 9/19 9/26   R knee PROM, stretching MM 15' + gastroc S MM 15' + gastroc S MM 10' MM 10' KS 10' MM 10'                              Neuro Re-Ed         Quad sets 20x5\" 20x5\" 20x5\"      Heel slides 20x strap 20x5\" strap 10x5\" strap p! 20x5\" strap 20x5\" strap 20x5\" strap   Mini squats         Weight shifting         QS + SLR 2x5 2x6 2x7 2x8 3x10 2x10 1#   Ecc lat step downs      2x10 4\"            Ther Ex         HEP review / edu 5'        Rec bike 6' rock 8' rock 8' rock 8' rock 8' rock 8' retro   LAQ 2x10 2x10 2x10 1# 2x10 2# 2x10 2# 2x10 3#   HR/TR 2x10 ea        Supine gastroc S 4x30\" 4x30\" 4x30\" 4x30\" 4x30\"    STS      + foam 2x10   FSU 2x10 4\" 2x10 4\" 2x10 4\" 2x10 6\" 2x10 6\" 2x10 6\"   LSU     2x10 6\" 2x10 6\"   Stand hip 3 way  15x ea B 15x ea B 15x ea B 15x ea B                      Ther Activity                                                               Gait Training         No AD                  Modalities                                                "

## 2024-10-03 ENCOUNTER — OFFICE VISIT (OUTPATIENT)
Age: 49
End: 2024-10-03
Payer: COMMERCIAL

## 2024-10-03 DIAGNOSIS — Z96.651 PAIN DUE TO TOTAL RIGHT KNEE REPLACEMENT, SUBSEQUENT ENCOUNTER: ICD-10-CM

## 2024-10-03 DIAGNOSIS — M25.661 STIFFNESS OF RIGHT KNEE: ICD-10-CM

## 2024-10-03 DIAGNOSIS — T84.84XD PAIN DUE TO TOTAL RIGHT KNEE REPLACEMENT, SUBSEQUENT ENCOUNTER: ICD-10-CM

## 2024-10-03 DIAGNOSIS — G89.29 CHRONIC PAIN OF RIGHT KNEE: Primary | ICD-10-CM

## 2024-10-03 DIAGNOSIS — Z96.659 HISTORY OF REVISION OF TOTAL KNEE ARTHROPLASTY: ICD-10-CM

## 2024-10-03 DIAGNOSIS — M25.561 CHRONIC PAIN OF RIGHT KNEE: Primary | ICD-10-CM

## 2024-10-03 PROCEDURE — 97140 MANUAL THERAPY 1/> REGIONS: CPT

## 2024-10-03 PROCEDURE — 97110 THERAPEUTIC EXERCISES: CPT

## 2024-10-03 PROCEDURE — 97112 NEUROMUSCULAR REEDUCATION: CPT

## 2024-10-03 NOTE — PROGRESS NOTES
Daily Note     Today's date: 10/3/2024  Patient name: Dahlia Encinas  : 1975  MRN: [id number]  Referring provider: Cata Cintron PA-C  Dx:   Encounter Diagnosis     ICD-10-CM    1. Chronic pain of right knee  M25.561     G89.29       2. Stiffness of right knee  M25.661       3. History of revision of total knee arthroplasty  Z96.659       4. Pain due to total right knee replacement, subsequent encounter  T84.84XD     Z96.651           Start Time: 1123  Stop Time: 1201  Total time in clinic (min): 38 minutes    Subjective: Pt reports R knee has been feeling better recently - did not wear brace for the past 2 days at work.  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      Objective: See treatment diary below    115 degrees knee flexion    Assessment: Tolerated treatment well. Patient would benefit from continued PT.  Continuing to address R knee mobility and motor control.  Functionality is steadily improving.  Knee flexion ROM is improving as well, but knee extension remains limited.  Pain with PROM overpressure in both flexion and extension.  1:1 with Oscar Lopes DPT entirety of tx.        Plan: Continue per plan of care.  Progress treatment as tolerated.       Precautions: hx of many R knee surgeries - (most recent R TKA 2021 and R TKA revision 2021); 2nd R TKA revision 2024    POC expires Unit limit Auth Expiration date PT/OT + Visit Limit?   10/14/24 BOMN N/A BOMN     Visit/Unit Tracking  AUTH Status:  Date  9/5 9/12 9/19 9/26 10/3   N/A Used 5 6 7 8 9 10 11    Remaining              Pertinent Findings:                                                                                            Test / Measure  2024  Pre-Op 2024  Post-Op 2024   FOTO (Predicted 34) N/A 1 60   TUG 8.79s w/ UE push off 38.31s w/ UE and crutches    R hip flexion MMT 3+/5 1/5    R knee flex/ext MMT 3+/5 1/5    R  "knee ROM -19 ext  80 flex -25 ext  65 flex          Manuals 8/27 8/29 9/5 9/12 9/19 9/26 10/3   R knee PROM, stretching MM 15' + gastroc S MM 15' + gastroc S MM 10' MM 10' KS 10' MM 10' MM 10'                                 Neuro Re-Ed          Quad sets 20x5\" 20x5\" 20x5\"       Heel slides 20x strap 20x5\" strap 10x5\" strap p! 20x5\" strap 20x5\" strap 20x5\" strap 20x5\" strap   Mini squats          Weight shifting          QS + SLR 2x5 2x6 2x7 2x8 3x10 2x10 1# 2x10 1#   Ecc lat step downs      2x10 4\" 2x10 4\"             Ther Ex          HEP review / edu 5'         Rec bike 6' rock 8' rock 8' rock 8' rock 8' rock 8' retro 8' full rev   LAQ 2x10 2x10 2x10 1# 2x10 2# 2x10 2# 2x10 3# 3x10 3#   HR/TR 2x10 ea         Supine gastroc S 4x30\" 4x30\" 4x30\" 4x30\" 4x30\"     STS      + foam 2x10 + foam 2x10 5#   FSU 2x10 4\" 2x10 4\" 2x10 4\" 2x10 6\" 2x10 6\" 2x10 6\" 2x10 6\"   LSU     2x10 6\" 2x10 6\" 2x10 6\"   Stand hip 3 way  15x ea B 15x ea B 15x ea B 15x ea B                         Ther Activity                                                                      Gait Training          No AD                    Modalities                                                     "

## 2024-10-04 ENCOUNTER — OFFICE VISIT (OUTPATIENT)
Dept: OBGYN CLINIC | Facility: CLINIC | Age: 49
End: 2024-10-04

## 2024-10-04 VITALS
HEART RATE: 68 BPM | DIASTOLIC BLOOD PRESSURE: 91 MMHG | SYSTOLIC BLOOD PRESSURE: 127 MMHG | HEIGHT: 64 IN | WEIGHT: 178 LBS | BODY MASS INDEX: 30.39 KG/M2

## 2024-10-04 DIAGNOSIS — Z96.651 S/P REVISION OF TOTAL KNEE, RIGHT: Primary | ICD-10-CM

## 2024-10-04 PROCEDURE — 99024 POSTOP FOLLOW-UP VISIT: CPT | Performed by: STUDENT IN AN ORGANIZED HEALTH CARE EDUCATION/TRAINING PROGRAM

## 2024-10-04 NOTE — PROGRESS NOTES
Subjective: 48 y.o. female presents to the office 7 weeks s/p right total knee arthroplasty revision for arthrofibrosis and overstuffed patellofemoral joint performed on 08/14/2024. She is doing well overall. 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 She has also noted some feelings of stiffness. Pain controlled. Progressing well. Incision without drainage. Denies fevers or chills. She is very happy with her results thus far.     Physical Exam:  Incision: well healed  ROM: 3-115 without pain  5/5 IP/Q/HS/TA/GS, 2+ DP/PT, SILT DP/SP/S/S/TN    No new imaging obtained today    Assessment/Plan:  7 weeks s/p right total knee arthroplasty revision for arthrofibrosis and overstuffed patellofemoral joint performed on 08/14/2024    - continue multi-modal pain control   - Weight bearing status: as tolerated  - DVT ppx: completed  - Continue PT/OT  - F/U in 5-6 weeks    Scribe Attestation      I,:  Rosario Seaman am acting as a scribe while in the presence of the attending physician.:       I,:  Jarocho Aguirre,  personally performed the services described in this documentation    as scribed in my presence.:

## 2024-10-10 ENCOUNTER — OFFICE VISIT (OUTPATIENT)
Age: 49
End: 2024-10-10
Payer: COMMERCIAL

## 2024-10-10 DIAGNOSIS — Z96.651 PAIN DUE TO TOTAL RIGHT KNEE REPLACEMENT, SUBSEQUENT ENCOUNTER: ICD-10-CM

## 2024-10-10 DIAGNOSIS — G89.29 CHRONIC PAIN OF RIGHT KNEE: Primary | ICD-10-CM

## 2024-10-10 DIAGNOSIS — Z96.659 HISTORY OF REVISION OF TOTAL KNEE ARTHROPLASTY: ICD-10-CM

## 2024-10-10 DIAGNOSIS — M25.561 CHRONIC PAIN OF RIGHT KNEE: Primary | ICD-10-CM

## 2024-10-10 DIAGNOSIS — T84.84XD PAIN DUE TO TOTAL RIGHT KNEE REPLACEMENT, SUBSEQUENT ENCOUNTER: ICD-10-CM

## 2024-10-10 DIAGNOSIS — M25.661 STIFFNESS OF RIGHT KNEE: ICD-10-CM

## 2024-10-10 PROCEDURE — 97140 MANUAL THERAPY 1/> REGIONS: CPT

## 2024-10-10 PROCEDURE — 97112 NEUROMUSCULAR REEDUCATION: CPT

## 2024-10-10 PROCEDURE — 97110 THERAPEUTIC EXERCISES: CPT

## 2024-10-10 NOTE — PROGRESS NOTES
Daily Note     Today's date: 10/10/2024  Patient name: Dahlia Encinas  : 1975  MRN: [id number]  Referring provider: Cata Cintron PA-C  Dx:   Encounter Diagnosis     ICD-10-CM    1. Chronic pain of right knee  M25.561     G89.29       2. Stiffness of right knee  M25.661       3. History of revision of total knee arthroplasty  Z96.659       4. Pain due to total right knee replacement, subsequent encounter  T84.84XD     Z96.651           Start Time: 1658  Stop Time: 1736  Total time in clinic (min): 38 minutes    Subjective: Pt reports nerve tingling - wearing tight pants aggravates symptoms (first time wearing tight pants since the surgery.  Continues to have significant difficulty with stair descent.        Objective: See treatment diary below      Assessment: Tolerated treatment well. Patient would benefit from continued PT.  Pt able to tolerate slight progression of planned therapeutic interventions this visit to address functional deficits.  Remains limited in both knee flexion and extension at this time - empty end feel for both.  Functionality is gradually improving.  1:1 with Ocsar Lopes DPT entirety of tx.        Plan: Continue per plan of care.  Progress treatment as tolerated.       Precautions: hx of many R knee surgeries - (most recent R TKA 2021 and R TKA revision 2021); 2nd R TKA revision 2024    POC expires Unit limit Auth Expiration date PT/OT + Visit Limit?   10/14/24 BOMN N/A BOMN     Visit/Unit Tracking  AUTH Status:  Date 8/26 8/29 9/5 9/12 9/19 9/26 10/3 10/10   N/A Used 5 6 7 8 9 10 11 12    Remaining               Pertinent Findings:                                                                                            Test / Measure  2024  Pre-Op 2024  Post-Op 2024   FOTO (Predicted 34) N/A 1 60   TUG 8.79s w/ UE push off 38.31s w/ UE and crutches    R hip flexion MMT 3+/5 1/5    R knee flex/ext MMT 3+/5 1/5    R knee ROM -19 ext  80 flex -25  "ext  65 flex          Manuals 8/29 9/5 9/12 9/19 9/26 10/3 10/10   R knee PROM, stretching MM 15' + gastroc S MM 10' MM 10' KS 10' MM 10' MM 10' MM 10'                                 Neuro Re-Ed          Quad sets 20x5\" 20x5\"        Heel slides 20x5\" strap 10x5\" strap p! 20x5\" strap 20x5\" strap 20x5\" strap 20x5\" strap 20x5\"   Mini squats          Weight shifting          QS + SLR 2x6 2x7 2x8 3x10 2x10 1# 2x10 1# 2x10 1#   Ecc lat step downs     2x10 4\" 2x10 4\"    Ecc fwd step downs       2x10 4\"   Nneka TKE       2x10 8#   Ther Ex          HEP review / edu          Rec bike 8' rock 8' rock 8' rock 8' rock 8' retro 8' full rev 8' L1   LAQ 2x10 2x10 1# 2x10 2# 2x10 2# 2x10 3# 3x10 3# 3x10 4#   HR/TR          Supine gastroc S 4x30\" 4x30\" 4x30\" 4x30\"      STS     + foam 2x10 + foam 2x10 5# + foam 2x10 5#   FSU 2x10 4\" 2x10 4\" 2x10 6\" 2x10 6\" 2x10 6\" 2x10 6\" 15x 6\"   LSU    2x10 6\" 2x10 6\" 2x10 6\" 15x 6\"   Stand hip 3 way 15x ea B 15x ea B 15x ea B 15x ea B                          Ther Activity                                                                      Gait Training          No AD                    Modalities                                                       "

## 2024-10-13 DIAGNOSIS — T84.84XA PAIN DUE TO TOTAL RIGHT KNEE REPLACEMENT, INITIAL ENCOUNTER (HCC): ICD-10-CM

## 2024-10-13 DIAGNOSIS — Z96.651 PAIN DUE TO TOTAL RIGHT KNEE REPLACEMENT, INITIAL ENCOUNTER (HCC): ICD-10-CM

## 2024-10-14 RX ORDER — ASPIRIN 81 MG/1
81 TABLET, COATED ORAL 2 TIMES DAILY
Qty: 60 TABLET | Refills: 5 | Status: SHIPPED | OUTPATIENT
Start: 2024-10-14

## 2024-10-17 ENCOUNTER — APPOINTMENT (OUTPATIENT)
Age: 49
End: 2024-10-17
Payer: COMMERCIAL

## 2024-10-24 ENCOUNTER — EVALUATION (OUTPATIENT)
Age: 49
End: 2024-10-24
Payer: COMMERCIAL

## 2024-10-24 DIAGNOSIS — G89.29 CHRONIC PAIN OF RIGHT KNEE: Primary | ICD-10-CM

## 2024-10-24 DIAGNOSIS — T84.84XD PAIN DUE TO TOTAL RIGHT KNEE REPLACEMENT, SUBSEQUENT ENCOUNTER: ICD-10-CM

## 2024-10-24 DIAGNOSIS — M25.661 STIFFNESS OF RIGHT KNEE: ICD-10-CM

## 2024-10-24 DIAGNOSIS — M25.561 CHRONIC PAIN OF RIGHT KNEE: Primary | ICD-10-CM

## 2024-10-24 DIAGNOSIS — Z96.651 PAIN DUE TO TOTAL RIGHT KNEE REPLACEMENT, SUBSEQUENT ENCOUNTER: ICD-10-CM

## 2024-10-24 DIAGNOSIS — Z96.659 HISTORY OF REVISION OF TOTAL KNEE ARTHROPLASTY: ICD-10-CM

## 2024-10-24 PROCEDURE — 97140 MANUAL THERAPY 1/> REGIONS: CPT

## 2024-10-24 PROCEDURE — 97110 THERAPEUTIC EXERCISES: CPT

## 2024-10-24 PROCEDURE — 97164 PT RE-EVAL EST PLAN CARE: CPT

## 2024-10-24 PROCEDURE — 97112 NEUROMUSCULAR REEDUCATION: CPT

## 2024-10-24 NOTE — PROGRESS NOTES
PT Re-Evaluation     Today's date: 10/24/2024  Patient name: Dahlia Encinas  : 1975  MRN: [id number]  Referring provider: Cata Cintron PA-C  Dx:   Encounter Diagnosis     ICD-10-CM    1. Chronic pain of right knee  M25.561     G89.29       2. Stiffness of right knee  M25.661       3. History of revision of total knee arthroplasty  Z96.659       4. Pain due to total right knee replacement, subsequent encounter  T84.84XD     Z96.651           Start Time: 1605  Stop Time: 1650  Total time in clinic (min): 45 minutes    Subjective: Pt reports current rehab status is at 70%.  States that she is still concerned with her knee ROM especially with flexion.  Remains unable to perform stair negotiation with step-over-step pattern.  Wants to get back to being able to bowl.  Pt would love to be able to garden at her house.  Able to walk the whole mall earlier without having to take a break.  Pain intensity averages to 1/10 - continues to be limited secondary to stiffness/tightness.  Stiffness worsens with prolonged sitting which she does as a .          Objective: See treatment diary below    Range of Motion: Goniometric revealed the following findings (in degrees).  Joint Motion Right: Left:   Knee Extension -12 0   Knee Flexion 108 140     Strength: MMT revealed the following findings.  Joint Motion Right: Left:   Hip Flexion 3+/5 4+/5   Hip Abduction 4-/5 4+/5   Hip Adduction 4-/5 4+/5   Hip Extension 4-/5 4+/5   Knee Extension 4-/5 5/5   Knee Flexion 4-/5 5/5   Ankle Plantarflexion 4+/5 4+/5   Ankle Dorsiflexion 4+/5 4+/5     Assessment: Working towards completing both short-term and long-term goals.  R knee strength and mobility is improved compared to both pre-op and post-op measurements but remains limited at this time.  Would benefit from further outpatient PT to address knee flexion/extension ROM as well as hip flexion and generalize knee strength.  RLE fatigues quickly with physical  activity.  Pain intensity is much improved but pain is worsened with overpressure stretching into both flexion and extension.  Tolerated treatment well. Patient would benefit from continued PT.  1:1 with Oscar Lopes DPT entirety of tx.    Impairments: abnormal coordination, abnormal gait, abnormal muscle firing, abnormal muscle tone, abnormal or restricted ROM, activity intolerance, impaired balance, impaired physical strength, lacks appropriate home exercise program, pain with function, weight-bearing intolerance, poor posture  and poor body mechanics  Functional limitations: prolonged standing/walking; ADLs - cooking, cleaning, dressing, bathing; stair negotiation  Symptom irritability: high    Assessment details: Dahlia Encinas is a 48 y.o. female presenting with R knee pain post-operatively to R TKA revision on 8/14/2024.  Primary impairments include chronic R knee pain with functional activities, R hip and knee weakness, R knee flexion and extension AROM dysfunction, quadriceps motor control dysfunction.  Educated pt on anatomy and physiology of diagnosis.  Will benefit from skilled PT interventions for community reintegration, ADL management/independence, return to work/sport/hobbies.  Provided pt with written home exercise program to be completed daily.    Understanding of Dx/Px/POC: good     Prognosis: fair    Goals    Short Term Goals:  1. Improve R knee extension ROM to at least -10 degrees - ONGOING  2. Improve R knee flexion ROM to at least 100 degrees - MET  3. Supervision with HEP for self-care - MET    Long Term Goals:  1. Improve R hip/knee strength to at least 4/5 MMT - ONGOING  2. FOTO to greater than predicted value - MET  3. Independent with HEP for self-care - ONGOING    Plan: Continue per plan of care.  Progress treatment as tolerated.      Patient would benefit from: skilled physical therapy  Planned modality interventions: manual electrical stimulation    Planned therapy interventions:  "abdominal trunk stabilization, activity modification, balance, balance/weight bearing training, behavior modification, body mechanics training, community reintegration, coordination, fine motor coordination training, flexibility, functional ROM exercises, gait training, graded activity, graded exercise, graded motor, home exercise program, work reintegration, therapeutic training, therapeutic exercise, therapeutic activities, stretching, strengthening, self care, postural training, patient education, neuromuscular re-education, motor coordination training, massage, manual therapy, joint mobilization and ADL training    Frequency: 1x week  Duration in weeks: 6  Plan of Care beginning date: 10/24/2024  Plan of Care expiration date: 12/5/2024  Treatment plan discussed with: patient     Precautions: hx of many R knee surgeries - (most recent R TKA 1/2021 and R TKA revision 12/2021); 2nd R TKA revision 8/14/2024    POC expires Unit limit Auth Expiration date PT/OT + Visit Limit?   12/5/24 BOMN N/A BOMN     Visit/Unit Tracking  AUTH Status:  Date 8/29 9/5 9/12 9/19 9/26 10/3 10/10 10/24   N/A Used 6 7 8 9 10 11 12 13    Remaining               Pertinent Findings:                                                                                            Test / Measure  7/22/2024  Pre-Op 8/16/2024  Post-Op 9/26/2024 10/24/2024   FOTO (Predicted 34) N/A 1 60 60   TUG 8.79s w/ UE push off 38.31s w/ UE and crutches     R hip flexion MMT 3+/5 1/5  3+/5   R knee flex/ext MMT 3+/5 1/5  4-/5   R knee ROM -19 ext  80 flex -25 ext  65 flex  -12 ext  108 flex         Manuals 9/5 9/12 9/19 9/26 10/3 10/10 10/24   R knee PROM, stretching MM 10' MM 10' KS 10' MM 10' MM 10' MM 10' MM 10'                                 Neuro Re-Ed          Quad sets 20x5\"         Heel slides 10x5\" strap p! 20x5\" strap 20x5\" strap 20x5\" strap 20x5\" strap 20x5\" 15x10\" strap   Mini squats          Weight shifting          QS + SLR 2x7 2x8 3x10 2x10 1# 2x10 " "1# 2x10 1# 2x10 2#   Ecc lat step downs    2x10 4\" 2x10 4\"     Ecc fwd step downs      2x10 4\" 2x10 4\"   [place] TKE      2x10 8# 2x10 9#   Ther Ex          HEP review / edu          Rec bike 8' rock 8' rock 8' rock 8' retro 8' full rev 8' L1 8' L1   LAQ 2x10 1# 2x10 2# 2x10 2# 2x10 3# 3x10 3# 3x10 4# 3x10 4#   HR/TR          Supine gastroc S 4x30\" 4x30\" 4x30\"       STS    + foam 2x10 + foam 2x10 5# + foam 2x10 5# + foam 2x10 10#   FSU 2x10 4\" 2x10 6\" 2x10 6\" 2x10 6\" 2x10 6\" 15x 6\" 15x 9\"   LSU   2x10 6\" 2x10 6\" 2x10 6\" 15x 6\" 15x 9\"   Stand hip 3 way 15x ea B 15x ea B 15x ea B                           Ther Activity                                                                      Gait Training          No AD                    Modalities                                                         "

## 2024-11-01 ENCOUNTER — OFFICE VISIT (OUTPATIENT)
Age: 49
End: 2024-11-01
Payer: COMMERCIAL

## 2024-11-01 DIAGNOSIS — T84.84XD PAIN DUE TO TOTAL RIGHT KNEE REPLACEMENT, SUBSEQUENT ENCOUNTER: ICD-10-CM

## 2024-11-01 DIAGNOSIS — M25.561 CHRONIC PAIN OF RIGHT KNEE: Primary | ICD-10-CM

## 2024-11-01 DIAGNOSIS — Z96.651 PAIN DUE TO TOTAL RIGHT KNEE REPLACEMENT, SUBSEQUENT ENCOUNTER: ICD-10-CM

## 2024-11-01 DIAGNOSIS — M25.661 STIFFNESS OF RIGHT KNEE: ICD-10-CM

## 2024-11-01 DIAGNOSIS — G89.29 CHRONIC PAIN OF RIGHT KNEE: Primary | ICD-10-CM

## 2024-11-01 DIAGNOSIS — Z96.659 HISTORY OF REVISION OF TOTAL KNEE ARTHROPLASTY: ICD-10-CM

## 2024-11-01 PROCEDURE — 97112 NEUROMUSCULAR REEDUCATION: CPT

## 2024-11-01 PROCEDURE — 97140 MANUAL THERAPY 1/> REGIONS: CPT

## 2024-11-01 PROCEDURE — 97110 THERAPEUTIC EXERCISES: CPT

## 2024-11-01 RX ORDER — CHOLECALCIFEROL (VITAMIN D3) 25 MCG
2000 TABLET ORAL DAILY
Qty: 60 TABLET | Refills: 1 | Status: SHIPPED | OUTPATIENT
Start: 2024-11-01

## 2024-11-01 NOTE — PROGRESS NOTES
"Daily Note     Today's date: 2024  Patient name: Dahlia Encinas  : 1975  MRN: [id number]  Referring provider: Cata Cintron PA-C  Dx:   Encounter Diagnosis     ICD-10-CM    1. Chronic pain of right knee  M25.561     G89.29       2. Stiffness of right knee  M25.661       3. History of revision of total knee arthroplasty  Z96.659       4. Pain due to total right knee replacement, subsequent encounter  T84.84XD     Z96.651           Start Time: 853  Stop Time: 931  Total time in clinic (min): 38 minutes    Subjective: Pt reports R knee stiffness persists.  States that nerve \"stinging\" sensation in R knee woke her up this morning.        Objective: See treatment diary below      Assessment: Tolerated treatment well. Patient would benefit from continued PT.  Pt had difficulty performing STS transfer training from chair height without UE assistance - only able to complete one set.  R knee flexion and extension ROM remains limited at this time.  Functionality is improving but remains limited as well.  1:1 with Oscar Lopes DPT entirety of tx.        Plan: Continue per plan of care.  Progress treatment as tolerated.       Precautions: hx of many R knee surgeries - (most recent R TKA 2021 and R TKA revision 2021); 2nd R TKA revision 2024    POC expires Unit limit Auth Expiration date PT/OT + Visit Limit?   24 BOMN N/A BOMN     Visit/Unit Tracking  AUTH Status:  Date 9/12 9/19 9/26 10/3 10/10 10/24 11/1   N/A Used 8 9 10 11 12 13 14    Remaining              Pertinent Findings:                                                                                            Test / Measure  2024  Pre-Op 2024  Post-Op 2024 10/24/2024   FOTO (Predicted 34) N/A 1 60 60   TUG 8.79s w/ UE push off 38.31s w/ UE and crutches     R hip flexion MMT 3+/5 1/5  3+/5   R knee flex/ext MMT 3+/5 1/5  4-/5   R knee ROM -19 ext  80 flex -25 ext  65 flex  -12 ext  108 flex         Manuals  " "9/26 10/3 10/10 10/24 11/1   R knee PROM, stretching MM 10' KS 10' MM 10' MM 10' MM 10' MM 10' MM 10'                                 Neuro Re-Ed          Quad sets          Heel slides 20x5\" strap 20x5\" strap 20x5\" strap 20x5\" strap 20x5\" 15x10\" strap 15x10\" strap   Mini squats          Weight shifting          QS + SLR 2x8 3x10 2x10 1# 2x10 1# 2x10 1# 2x10 2# 2x10 2#   Ecc lat step downs   2x10 4\" 2x10 4\"      Ecc fwd step downs     2x10 4\" 2x10 4\" 2x10 4\"   [place] TKE     2x10 8# 2x10 9# 2x10 10#   Ther Ex          HEP review / edu          Rec bike 8' rock 8' rock 8' retro 8' full rev 8' L1 8' L1 8' L2   LAQ 2x10 2# 2x10 2# 2x10 3# 3x10 3# 3x10 4# 3x10 4# 3x10 5#   HR/TR          Supine gastroc S 4x30\" 4x30\"        STS   + foam 2x10 + foam 2x10 5# + foam 2x10 5# + foam 2x10 10# 10x chair   FSU 2x10 6\" 2x10 6\" 2x10 6\" 2x10 6\" 15x 6\" 15x 9\" 15x 9\"   LSU  2x10 6\" 2x10 6\" 2x10 6\" 15x 6\" 15x 9\" 15x 9\"   Stand hip 3 way 15x ea B 15x ea B        Supine HS S       10x10\" strap   Supine quad S       10x10\" strap   Ther Activity                                                                      Gait Training          No AD                    Modalities                                                           "

## 2024-11-05 ENCOUNTER — OFFICE VISIT (OUTPATIENT)
Age: 49
End: 2024-11-05
Payer: COMMERCIAL

## 2024-11-05 DIAGNOSIS — M25.561 CHRONIC PAIN OF RIGHT KNEE: Primary | ICD-10-CM

## 2024-11-05 DIAGNOSIS — Z96.659 HISTORY OF REVISION OF TOTAL KNEE ARTHROPLASTY: ICD-10-CM

## 2024-11-05 DIAGNOSIS — T84.84XD PAIN DUE TO TOTAL RIGHT KNEE REPLACEMENT, SUBSEQUENT ENCOUNTER: ICD-10-CM

## 2024-11-05 DIAGNOSIS — G89.29 CHRONIC PAIN OF RIGHT KNEE: Primary | ICD-10-CM

## 2024-11-05 DIAGNOSIS — M25.661 STIFFNESS OF RIGHT KNEE: ICD-10-CM

## 2024-11-05 DIAGNOSIS — Z96.651 PAIN DUE TO TOTAL RIGHT KNEE REPLACEMENT, SUBSEQUENT ENCOUNTER: ICD-10-CM

## 2024-11-05 PROCEDURE — 97110 THERAPEUTIC EXERCISES: CPT

## 2024-11-05 PROCEDURE — 97140 MANUAL THERAPY 1/> REGIONS: CPT

## 2024-11-05 NOTE — PROGRESS NOTES
Daily Note     Today's date: 2024  Patient name: Dahlia Encinas  : 1975  MRN: [id number]  Referring provider: Cata Cintron PA-C  Dx:   Encounter Diagnosis     ICD-10-CM    1. Chronic pain of right knee  M25.561     G89.29       2. Stiffness of right knee  M25.661       3. History of revision of total knee arthroplasty  Z96.659       4. Pain due to total right knee replacement, subsequent encounter  T84.84XD     Z96.651           Start Time: 1530  Stop Time: 1615  Total time in clinic (min): 45 minutes    Subjective: Pt reports significant pain ans discomfort following last tx session.  States discomfort is much improved prior to start of this visit.        Objective: See treatment diary below      Assessment: Tolerated treatment well. Patient would benefit from continued PT.  Pt with patellar tendon discomfort with attempted progression of quadriceps strengthening on TKE and eccentric step downs.  Continues ot be significantly pain limited with PROM and with progression of exercises.  Knee flexion and extension ROM remains at a deficit - unable to apply enough overpressure secondary to pain.  1:1 with Oscar Lopes DPT entirety of tx.        Plan: Continue per plan of care.  Progress treatment as tolerated.       Precautions: hx of many R knee surgeries - (most recent R TKA 2021 and R TKA revision 2021); 2nd R TKA revision 2024    POC expires Unit limit Auth Expiration date PT/OT + Visit Limit?   24 BOMN N/A BOMN     Visit/Unit Tracking  AUTH Status:  Date 9/26 10/3 10/10 10/24 11/1 11/5   N/A Used 10 11 12 13 14 15    Remaining             Pertinent Findings:                                                                                            Test / Measure  2024  Pre-Op 2024  Post-Op 2024 10/24/2024   FOTO (Predicted 34) N/A 1 60 60   TUG 8.79s w/ UE push off 38.31s w/ UE and crutches     R hip flexion MMT 3+/5 1/5  3+/5   R knee flex/ext MMT 3+/5 1/5  4-/5  "  R knee ROM -19 ext  80 flex -25 ext  65 flex  -12 ext  108 flex         Manuals 9/19 9/26 10/3 10/10 10/24 11/1 11/5   R knee PROM, stretching KS 10' MM 10' MM 10' MM 10' MM 10' MM 10' MM 10'                                 Neuro Re-Ed          Heel slides 20x5\" strap 20x5\" strap 20x5\" strap 20x5\" 15x10\" strap 15x10\" strap 15x10\" strap   QS + SLR 3x10 2x10 1# 2x10 1# 2x10 1# 2x10 2# 2x10 2# 2x10 2#   Ecc lat step downs  2x10 4\" 2x10 4\"       Ecc fwd step downs    2x10 4\" 2x10 4\" 2x10 4\" Held p!   [place] TKE    2x10 8# 2x10 9# 2x10 10# 2x10 12#   R SLS       3x30\"   Ther Ex          HEP review / edu          Rec bike 8' rock 8' retro 8' full rev 8' L1 8' L1 8' L2 8' L2   LAQ 2x10 2# 2x10 3# 3x10 3# 3x10 4# 3x10 4# 3x10 5# 3x10 5#   Seated HS curl       2x10 btb   Supine gastroc S 4x30\"         STS  + foam 2x10 + foam 2x10 5# + foam 2x10 5# + foam 2x10 10# 10x chair + foam 2x10 10#   FSU 2x10 6\" 2x10 6\" 2x10 6\" 15x 6\" 15x 9\" 15x 9\" 15x 9\"   LSU 2x10 6\" 2x10 6\" 2x10 6\" 15x 6\" 15x 9\" 15x 9\"    Stand hip 3 way 15x ea B         Supine HS S      10x10\" strap 10x10\" strap   Supine quad S      10x10\" strap 10x10\" strap   Ther Activity                                                                      Gait Training          No AD                    Modalities                                                             "

## 2024-11-11 ENCOUNTER — OFFICE VISIT (OUTPATIENT)
Age: 49
End: 2024-11-11
Payer: COMMERCIAL

## 2024-11-11 DIAGNOSIS — M25.661 STIFFNESS OF RIGHT KNEE: ICD-10-CM

## 2024-11-11 DIAGNOSIS — G89.29 CHRONIC PAIN OF RIGHT KNEE: Primary | ICD-10-CM

## 2024-11-11 DIAGNOSIS — T84.84XD PAIN DUE TO TOTAL RIGHT KNEE REPLACEMENT, SUBSEQUENT ENCOUNTER: ICD-10-CM

## 2024-11-11 DIAGNOSIS — Z96.651 PAIN DUE TO TOTAL RIGHT KNEE REPLACEMENT, SUBSEQUENT ENCOUNTER: ICD-10-CM

## 2024-11-11 DIAGNOSIS — M25.561 CHRONIC PAIN OF RIGHT KNEE: Primary | ICD-10-CM

## 2024-11-11 DIAGNOSIS — Z96.659 HISTORY OF REVISION OF TOTAL KNEE ARTHROPLASTY: ICD-10-CM

## 2024-11-11 PROCEDURE — 97112 NEUROMUSCULAR REEDUCATION: CPT

## 2024-11-11 PROCEDURE — 97110 THERAPEUTIC EXERCISES: CPT

## 2024-11-11 NOTE — PROGRESS NOTES
Daily Note     Today's date: 2024  Patient name: Dahlia Encinas  : 1975  MRN: [id number]  Referring provider: Cata Cintron PA-C  Dx:   Encounter Diagnosis     ICD-10-CM    1. Chronic pain of right knee  M25.561     G89.29       2. Stiffness of right knee  M25.661       3. History of revision of total knee arthroplasty  Z96.659       4. Pain due to total right knee replacement, subsequent encounter  T84.84XD     Z96.651           Start Time: 1718  Stop Time: 1741  Total time in clinic (min): 23 minutes    Subjective: Pt reports increase in swelling and discomfort after line dancing over the weekend.      Objective: See treatment diary below      Assessment: Tolerated treatment well. Patient would benefit from continued PT.  Discussed her improved functionality as she was able to go line dancing.  Although there was a slight increase in swelling and discomfort she was finally able to get back to one of her hobbies.  Slightly regressed planned exercises this visit secondary to aggravation of symptoms.  Pain remains with PROM end ranges.  1:1 with Oscar Lopes DPT entirety of tx.        Plan: Continue per plan of care.  Progress treatment as tolerated.       Precautions: hx of many R knee surgeries - (most recent R TKA 2021 and R TKA revision 2021); 2nd R TKA revision 2024    POC expires Unit limit Auth Expiration date PT/OT + Visit Limit?   24 BOMN N/A BOMN     Visit/Unit Tracking  AUTH Status:  Date 10/3 10/10 10/24 11/1 11/5 11/11   N/A Used 11 12 13 14 15 16    Remaining             Pertinent Findings:                                                                                            Test / Measure  2024  Pre-Op 2024  Post-Op 2024 10/24/2024   FOTO (Predicted 34) N/A 1 60 60   TUG 8.79s w/ UE push off 38.31s w/ UE and crutches     R hip flexion MMT 3+/5 1/5  3+/5   R knee flex/ext MMT 3+/5 1/5  4-/5   R knee ROM -19 ext  80 flex -25 ext  65 flex  -12  "ext  108 flex         Manuals 9/26 10/3 10/10 10/24 11/1 11/5 11/11   R knee PROM, stretching MM 10' MM 10' MM 10' MM 10' MM 10' MM 10' MM 10'                                 Neuro Re-Ed          Heel slides 20x5\" strap 20x5\" strap 20x5\" 15x10\" strap 15x10\" strap 15x10\" strap 15x10\" strap   QS + SLR 2x10 1# 2x10 1# 2x10 1# 2x10 2# 2x10 2# 2x10 2#    Ecc lat step downs 2x10 4\" 2x10 4\"     2x10 4\"   Ecc fwd step downs   2x10 4\" 2x10 4\" 2x10 4\" Held p!    [place] TKE   2x10 8# 2x10 9# 2x10 10# 2x10 12#    R SLS      3x30\"    Ther Ex          HEP review / edu          Rec bike 8' retro 8' full rev 8' L1 8' L1 8' L2 8' L2 8' L2   LAQ 2x10 3# 3x10 3# 3x10 4# 3x10 4# 3x10 5# 3x10 5# 3x10 5#   Seated HS curl      2x10 btb    Supine gastroc S          STS + foam 2x10 + foam 2x10 5# + foam 2x10 5# + foam 2x10 10# 10x chair + foam 2x10 10# + foam 2x10 15#   FSU 2x10 6\" 2x10 6\" 15x 6\" 15x 9\" 15x 9\" 15x 9\"    LSU 2x10 6\" 2x10 6\" 15x 6\" 15x 9\" 15x 9\"     Stand hip 3 way          Supine HS S     10x10\" strap 10x10\" strap 4x30\" strap   Supine quad S     10x10\" strap 10x10\" strap 4x30\" strap   Ther Activity                                                                      Gait Training          No AD                    Modalities                                                               "

## 2024-11-18 ENCOUNTER — APPOINTMENT (OUTPATIENT)
Age: 49
End: 2024-11-18
Payer: COMMERCIAL

## 2024-11-29 ENCOUNTER — OFFICE VISIT (OUTPATIENT)
Dept: OBGYN CLINIC | Facility: CLINIC | Age: 49
End: 2024-11-29

## 2024-11-29 VITALS
WEIGHT: 178 LBS | HEIGHT: 64 IN | HEART RATE: 64 BPM | SYSTOLIC BLOOD PRESSURE: 147 MMHG | DIASTOLIC BLOOD PRESSURE: 88 MMHG | BODY MASS INDEX: 30.39 KG/M2

## 2024-11-29 DIAGNOSIS — Z96.651 S/P REVISION OF TOTAL KNEE, RIGHT: Primary | ICD-10-CM

## 2024-11-29 PROCEDURE — 99024 POSTOP FOLLOW-UP VISIT: CPT | Performed by: STUDENT IN AN ORGANIZED HEALTH CARE EDUCATION/TRAINING PROGRAM

## 2024-11-29 NOTE — PROGRESS NOTES
Subjective: 49 y.o. female presents to the office 3 months s/p right total knee arthroplasty revision for arthrofibrosis and overstuffed patellofemoral joint performed on 08/14/2024. She is doing well overall. 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 She has also noted some feelings of stiffness. Progressing well. Incision well-healed. Denies fevers or chills. She is very happy with her results.      Physical Exam:  Incision: well healed  ROM: 3-115 without pain  5/5 IP/Q/HS/TA/GS, 2+ DP/PT, SILT DP/SP/S/S/TN    No new imaging obtained today    Assessment/Plan:  3 months s/p right total knee arthroplasty revision for arthrofibrosis and overstuffed patellofemoral joint performed on 08/14/2024    - continue multi-modal pain control   - Weight bearing status: as tolerated  - DVT ppx: completed  - Continue PT/OT  - F/U in 9 months for one year check up       Scribe Attestation      I,:   am acting as a scribe while in the presence of the attending physician.:       I,:   personally performed the services described in this documentation    as scribed in my presence.:

## 2025-01-02 ENCOUNTER — APPOINTMENT (OUTPATIENT)
Age: 50
End: 2025-01-02
Payer: COMMERCIAL

## 2025-01-09 ENCOUNTER — EVALUATION (OUTPATIENT)
Age: 50
End: 2025-01-09
Payer: COMMERCIAL

## 2025-01-09 DIAGNOSIS — M25.561 CHRONIC PAIN OF RIGHT KNEE: Primary | ICD-10-CM

## 2025-01-09 DIAGNOSIS — Z96.651 PAIN DUE TO TOTAL RIGHT KNEE REPLACEMENT, SUBSEQUENT ENCOUNTER: ICD-10-CM

## 2025-01-09 DIAGNOSIS — T84.84XD PAIN DUE TO TOTAL RIGHT KNEE REPLACEMENT, SUBSEQUENT ENCOUNTER: ICD-10-CM

## 2025-01-09 DIAGNOSIS — Z96.659 HISTORY OF REVISION OF TOTAL KNEE ARTHROPLASTY: ICD-10-CM

## 2025-01-09 DIAGNOSIS — M25.661 STIFFNESS OF RIGHT KNEE: ICD-10-CM

## 2025-01-09 DIAGNOSIS — G89.29 CHRONIC PAIN OF RIGHT KNEE: Primary | ICD-10-CM

## 2025-01-09 PROCEDURE — 97112 NEUROMUSCULAR REEDUCATION: CPT

## 2025-01-09 PROCEDURE — 97110 THERAPEUTIC EXERCISES: CPT

## 2025-01-09 PROCEDURE — 97530 THERAPEUTIC ACTIVITIES: CPT

## 2025-01-09 PROCEDURE — 97164 PT RE-EVAL EST PLAN CARE: CPT

## 2025-01-09 PROCEDURE — 97140 MANUAL THERAPY 1/> REGIONS: CPT

## 2025-01-09 NOTE — PROGRESS NOTES
PT Re-Evaluation     Today's date: 2025  Patient name: Dahlia Encinas  : 1975  MRN: [id number]  Referring provider: Cata Cintron PA-C  Dx:   Encounter Diagnosis     ICD-10-CM    1. Chronic pain of right knee  M25.561     G89.29       2. Stiffness of right knee  M25.661       3. History of revision of total knee arthroplasty  Z96.659       4. Pain due to total right knee replacement, subsequent encounter  T84.84XD     Z96.651           Start Time: 1605  Stop Time: 1650  Total time in clinic (min): 45 minutes    Subjective: Pt reports inclement weather aggravates symptoms.  States that stiffness and swelling is still a concern.  Pt goal is to get back to the gym. Wants to be able to bike and walk/hike for the spring/summer.  Physician told her to not use the leg extension machine.  R knee status is at 75%.  Knee flexion is most limiting factor and the pain remains bothersome.        Objective: See treatment diary below    Range of Motion: Goniometric revealed the following findings (in degrees).  Joint Motion Right: Left:   Knee Extension -10 0   Knee Flexion 116 140     Strength: MMT revealed the following findings.  Joint Motion Right: Left:   Hip Flexion 3+/5 4+/5   Hip Abduction 4-/5 4+/5   Hip Adduction 4-/5 4+/5   Hip Extension 4-/5 4+/5   Knee Extension 4-/5 5/5   Knee Flexion 4-/5 5/5   Ankle Plantarflexion 4+/5 4+/5   Ankle Dorsiflexion 4+/5 4+/5       Assessment: Pt returns following hiatus secondary to starting new job.  Slight improvement of R knee mobility in both flexion and extension; although, it remains limited at this time.  R knee strength remains limited as well.  Tolerated treatment well. Patient would benefit from continued PT.  1:1 with Oscar Lopes DPT entirety of tx.      Impairments: abnormal coordination, abnormal gait, abnormal muscle firing, abnormal muscle tone, abnormal or restricted ROM, activity intolerance, impaired balance, impaired physical strength, lacks  appropriate home exercise program, pain with function, weight-bearing intolerance, poor posture  and poor body mechanics  Functional limitations: prolonged standing/walking; ADLs - cooking, cleaning, dressing, bathing; stair negotiation  Symptom irritability: high    Assessment details: Dahlia Encinas is a 48 y.o. female presenting with R knee pain post-operatively to R TKA revision on 8/14/2024.  Primary impairments include chronic R knee pain with functional activities, R hip and knee weakness, R knee flexion and extension AROM dysfunction, quadriceps motor control dysfunction.  Educated pt on anatomy and physiology of diagnosis.  Will benefit from skilled PT interventions for community reintegration, ADL management/independence, return to work/sport/hobbies.  Provided pt with written home exercise program to be completed daily.    Understanding of Dx/Px/POC: good     Prognosis: fair    Goals    Short Term Goals:  1. Improve R knee extension ROM to at least -10 degrees - MET  2. Improve R knee flexion ROM to at least 100 degrees - MET  3. Supervision with HEP for self-care - MET    Long Term Goals:  1. Improve R hip/knee strength to at least 4/5 MMT - ONGOING  2. FOTO to greater than predicted value - MET  3. Independent with HEP for self-care - ONGOING    Plan: Continue per plan of care.  Progress treatment as tolerated.      Patient would benefit from: skilled physical therapy  Planned modality interventions: manual electrical stimulation    Planned therapy interventions: abdominal trunk stabilization, activity modification, balance, balance/weight bearing training, behavior modification, body mechanics training, community reintegration, coordination, fine motor coordination training, flexibility, functional ROM exercises, gait training, graded activity, graded exercise, graded motor, home exercise program, work reintegration, therapeutic training, therapeutic exercise, therapeutic activities, stretching,  "strengthening, self care, postural training, patient education, neuromuscular re-education, motor coordination training, massage, manual therapy, joint mobilization and ADL training    Frequency: 1x week  Duration in weeks: 8  Plan of Care beginning date: 10/24/2024  Plan of Care expiration date: 3/6/2025  Treatment plan discussed with: patient     Precautions: hx of many R knee surgeries - (most recent R TKA 1/2021 and R TKA revision 12/2021); 2nd R TKA revision 8/14/2024    POC expires Unit limit Auth Expiration date PT/OT + Visit Limit?   3/6/25 BOMN N/A BOMN     Visit/Unit Tracking  AUTH Status:  Date 10/3 10/10 10/24 11/1 11/5 11/11 1/9   N/A Used 11 12 13 14 15 16 17    Remaining              Pertinent Findings:                                                                                            Test / Measure  7/22/2024  Pre-Op 8/16/2024  Post-Op 9/26/2024 10/24/2024 1/9/2025   FOTO (Predicted 34) N/A 1 60 60    TUG 8.79s w/ UE push off 38.31s w/ UE and crutches      R hip flexion MMT 3+/5 1/5  3+/5 3+/5   R knee flex/ext MMT 3+/5 1/5  4-/5 4-/5   R knee ROM -19 ext  80 flex -25 ext  65 flex  -12 ext  108 flex -10 ext  116 flex         Manuals 10/10 10/24 11/1 11/5 11/11 1/9   R knee PROM, stretching MM 10' MM 10' MM 10' MM 10' MM 10' MM 8'                              Neuro Re-Ed         Heel slides 20x5\" 15x10\" strap 15x10\" strap 15x10\" strap 15x10\" strap 30x thick blue band   QS + SLR 2x10 1# 2x10 2# 2x10 2# 2x10 2#     Ecc lat step downs     2x10 4\"    Ecc fwd step downs 2x10 4\" 2x10 4\" 2x10 4\" Held p!     [place] TKE 2x10 8# 2x10 9# 2x10 10# 2x10 12#     R SLS    3x30\"     Ther Ex         HEP review / edu         Rec bike 8' L1 8' L1 8' L2 8' L2 8' L2 8' L1   LAQ 3x10 4# 3x10 4# 3x10 5# 3x10 5# 3x10 5# Sula  3x10 5#   Seated HS curl    2x10 btb  Sula 3x10 4#   Stand hip 3 way         Supine HS S   10x10\" strap 10x10\" strap 4x30\" strap    Supine quad S   10x10\" strap 10x10\" strap 4x30\" strap  " "  Leg press      10x 75#  2x10 85#   R SL leg press      2x10 35#  10x 45#   Ther Activity         STS + foam 2x10 5# + foam 2x10 10# 10x chair + foam 2x10 10# + foam 2x10 15#    FSU 15x 6\" 15x 9\" 15x 9\" 15x 9\"  2x10 9\"   LSU 15x 6\" 15x 9\" 15x 9\"                                 Gait Training         No AD                  Modalities                                                              "

## 2025-01-16 ENCOUNTER — APPOINTMENT (OUTPATIENT)
Age: 50
End: 2025-01-16
Payer: COMMERCIAL

## 2025-01-23 ENCOUNTER — APPOINTMENT (OUTPATIENT)
Age: 50
End: 2025-01-23
Payer: COMMERCIAL

## 2025-01-30 ENCOUNTER — APPOINTMENT (OUTPATIENT)
Age: 50
End: 2025-01-30
Payer: COMMERCIAL

## 2025-03-20 ENCOUNTER — APPOINTMENT (OUTPATIENT)
Dept: LAB | Facility: CLINIC | Age: 50
End: 2025-03-20

## 2025-03-20 DIAGNOSIS — Z00.6 ENCOUNTER FOR EXAMINATION FOR NORMAL COMPARISON OR CONTROL IN CLINICAL RESEARCH PROGRAM: ICD-10-CM

## 2025-03-20 PROCEDURE — 36415 COLL VENOUS BLD VENIPUNCTURE: CPT

## 2025-04-01 LAB
APOB+LDLR+PCSK9 GENE MUT ANL BLD/T: NOT DETECTED
BRCA1+BRCA2 DEL+DUP + FULL MUT ANL BLD/T: NOT DETECTED
MLH1+MSH2+MSH6+PMS2 GN DEL+DUP+FUL M: NOT DETECTED

## (undated) DEVICE — SURGICAL GOWN, XL SMARTSLEEVE: Brand: CONVERTORS

## (undated) DEVICE — CHLORAPREP HI-LITE 26ML ORANGE

## (undated) DEVICE — GLOVE INDICATOR PI UNDERGLOVE SZ 6.5 BLUE

## (undated) DEVICE — COBAN 6 IN STERILE

## (undated) DEVICE — GLOVE SRG BIOGEL 6.5

## (undated) DEVICE — INTENDED FOR TISSUE SEPARATION, AND OTHER PROCEDURES THAT REQUIRE A SHARP SURGICAL BLADE TO PUNCTURE OR CUT.: Brand: BARD-PARKER SAFETY BLADES SIZE 10, STERILE

## (undated) DEVICE — NEEDLE 18 G X 1 1/2

## (undated) DEVICE — CUFF TOURNIQUET 30 X 4 IN QUICK CONNECT DISP 1BLA

## (undated) DEVICE — 3M™ STERI-DRAPE™ U-DRAPE 1015: Brand: STERI-DRAPE™

## (undated) DEVICE — SPECIMEN CONTAINER STERILE PEEL PACK

## (undated) DEVICE — GLOVE SRG BIOGEL ORTHOPEDIC 8.5

## (undated) DEVICE — MICRO HVTSA, 0.5G AND HVTSA SOURCEMARK PRODUCT CODE M1206 AND M1206-01: Brand: EXOFIN MICRO HVTSA, 0.5G

## (undated) DEVICE — 450 ML BOTTLE OF 0.05% CHLORHEXIDINE GLUCONATE IN 99.95% STERILE WATER FOR IRRIGATION, USP AND APPLICATOR.: Brand: IRRISEPT ANTIMICROBIAL WOUND LAVAGE

## (undated) DEVICE — DUAL CUT SAGITTAL BLADE

## (undated) DEVICE — GLOVE SRG BIOGEL 8.5

## (undated) DEVICE — ACE WRAP 6 IN UNSTERILE

## (undated) DEVICE — BETHLEHEM UNIV MAJ EXT ,KIT: Brand: CARDINAL HEALTH

## (undated) DEVICE — SUT STRATAFIX SPIRAL 3-0 PGA/PCL 30 X 30 CM SXMD2B408

## (undated) DEVICE — SYRINGE 30ML LL

## (undated) DEVICE — HOOD: Brand: FLYTE, SURGICOOL

## (undated) DEVICE — ANTIBACTERIAL UNDYED BRAIDED (POLYGLACTIN 910), SYNTHETIC ABSORBABLE SUTURE: Brand: COATED VICRYL

## (undated) DEVICE — HEAVY DUTY TABLE COVER: Brand: CONVERTORS

## (undated) DEVICE — ANTIBACTERIAL VIOLET BRAIDED (POLYGLACTIN 910), SYNTHETIC ABSORBABLE SURGICAL SUTURE: Brand: COATED VICRYL

## (undated) DEVICE — DRESSING MEPILEX AG BORDER POST-OP 4 X 12 IN

## (undated) DEVICE — GLOVE INDICATOR PI UNDERGLOVE SZ 8.5 BLUE

## (undated) DEVICE — HANDPIECE SET WITH RETRACTABLE COAXIAL FAN SPRAY TIP AND SUCTION TUBE: Brand: INTERPULSE

## (undated) DEVICE — EXOFIN PRECISION PEN HIGH VISCOSITY TOPICAL SKIN ADHESIVE: Brand: EXOFIN PRECISION PEN, 1G

## (undated) DEVICE — SPONGE SCRUB 4 PCT CHLORHEXIDINE

## (undated) DEVICE — SUT STRATAFIX SPIRAL 1-0 PDO 36 X 36 CM SXPD2B405

## (undated) DEVICE — PLUMEPEN PRO 10FT

## (undated) DEVICE — IMPERVIOUS STOCKINETTE: Brand: DEROYAL